# Patient Record
Sex: FEMALE | Race: WHITE | NOT HISPANIC OR LATINO | Employment: FULL TIME | URBAN - NONMETROPOLITAN AREA
[De-identification: names, ages, dates, MRNs, and addresses within clinical notes are randomized per-mention and may not be internally consistent; named-entity substitution may affect disease eponyms.]

---

## 2017-01-11 ENCOUNTER — AMBULATORY - GICH (OUTPATIENT)
Dept: INTERNAL MEDICINE | Facility: OTHER | Age: 32
End: 2017-01-11

## 2017-01-11 ENCOUNTER — HISTORY (OUTPATIENT)
Dept: SURGERY | Facility: OTHER | Age: 32
End: 2017-01-11

## 2017-01-18 ENCOUNTER — SURGERY (OUTPATIENT)
Dept: SURGERY | Facility: OTHER | Age: 32
End: 2017-01-18

## 2017-04-06 ENCOUNTER — HISTORY (OUTPATIENT)
Dept: OBGYN | Facility: OTHER | Age: 32
End: 2017-04-06

## 2017-04-06 ENCOUNTER — AMBULATORY - GICH (OUTPATIENT)
Dept: SCHEDULING | Facility: OTHER | Age: 32
End: 2017-04-06

## 2017-04-06 ENCOUNTER — PRENATAL OFFICE VISIT - GICH (OUTPATIENT)
Dept: OBGYN | Facility: OTHER | Age: 32
End: 2017-04-06

## 2017-04-06 DIAGNOSIS — Z34.91 ENCOUNTER FOR SUPERVISION OF NORMAL PREGNANCY IN FIRST TRIMESTER: ICD-10-CM

## 2017-04-06 LAB
ABORH - HISTORICAL: NORMAL
ABSOLUTE BASOPHILS - HISTORICAL: 0.1 THOU/CU MM
ABSOLUTE EOSINOPHILS - HISTORICAL: 0.2 THOU/CU MM
ABSOLUTE LYMPHOCYTES - HISTORICAL: 2 THOU/CU MM (ref 0.9–2.9)
ABSOLUTE MONOCYTES - HISTORICAL: 0.5 THOU/CU MM
ABSOLUTE NEUTROPHILS - HISTORICAL: 4.7 THOU/CU MM (ref 1.7–7)
ANTIBODY SCREEN - HISTORICAL: NEGATIVE
BASOPHILS # BLD AUTO: 1.8 %
BILIRUB UR QL: NEGATIVE
CLARITY, URINE: CLEAR CLARITY
COLOR UR: YELLOW COLOR
EOSINOPHIL NFR BLD AUTO: 2.2 %
ERYTHROCYTE [DISTWIDTH] IN BLOOD BY AUTOMATED COUNT: 12.7 % (ref 11.5–15.5)
GLUCOSE URINE: NEGATIVE MG/DL
HBSAG CATEGORY - HISTORICAL: NONREACTIVE
HCT VFR BLD AUTO: 40.7 % (ref 33–51)
HEMOGLOBIN: 13.4 G/DL (ref 12–16)
HIV-1/HIV-2 ANTIBODY CATEGORY - HISTORICAL: NORMAL
KETONES UR QL: NEGATIVE MG/DL
LEUKOCYTE ESTERASE URINE: NEGATIVE
LYMPHOCYTES NFR BLD AUTO: 26.6 % (ref 20–44)
MCH RBC QN AUTO: 28.2 PG (ref 26–34)
MCHC RBC AUTO-ENTMCNC: 32.9 G/DL (ref 32–36)
MCV RBC AUTO: 86 FL (ref 80–100)
MONOCYTES NFR BLD AUTO: 6.8 %
NEUTROPHILS NFR BLD AUTO: 62.6 % (ref 42–72)
NITRITE UR QL STRIP: NEGATIVE
OCCULT BLOOD,URINE - HISTORICAL: NEGATIVE
PH UR: 6 [PH]
PLATELET # BLD AUTO: 266 THOU/CU MM (ref 140–440)
PMV BLD: 8.9 FL (ref 6.5–11)
PROTEIN QUALITATIVE,URINE - HISTORICAL: NEGATIVE MG/DL
RED BLOOD COUNT - HISTORICAL: 4.74 MIL/CU MM (ref 4–5.2)
RUBELLA COMMENT - HISTORICAL: NORMAL
SP GR UR STRIP: 1.02
SPECIMEN EXPIRATION DATE/TIME - HISTORICAL: NORMAL
UROBILINOGEN,QUALITATIVE - HISTORICAL: NORMAL EU/DL
WHITE BLOOD COUNT - HISTORICAL: 7.5 THOU/CU MM (ref 4.5–11)

## 2017-04-07 LAB
CULTURE - HISTORICAL: NORMAL
TREPONEMA PALLIDUM - HISTORICAL: NEGATIVE

## 2017-04-13 LAB
CF INTERPRETATION - HISTORICAL: NORMAL
CF RESULT - HISTORICAL: NORMAL

## 2017-04-14 ENCOUNTER — HOSPITAL ENCOUNTER (OUTPATIENT)
Dept: RADIOLOGY | Facility: OTHER | Age: 32
End: 2017-04-14
Attending: OBSTETRICS & GYNECOLOGY

## 2017-04-14 DIAGNOSIS — Z34.91 ENCOUNTER FOR SUPERVISION OF NORMAL PREGNANCY IN FIRST TRIMESTER: ICD-10-CM

## 2017-05-12 ENCOUNTER — PRENATAL OFFICE VISIT - GICH (OUTPATIENT)
Dept: OBGYN | Facility: OTHER | Age: 32
End: 2017-05-12

## 2017-05-12 ENCOUNTER — HISTORY (OUTPATIENT)
Dept: OBGYN | Facility: OTHER | Age: 32
End: 2017-05-12

## 2017-05-12 DIAGNOSIS — Z34.92 ENCOUNTER FOR SUPERVISION OF NORMAL PREGNANCY IN SECOND TRIMESTER: ICD-10-CM

## 2017-06-10 ENCOUNTER — COMMUNICATION - GICH (OUTPATIENT)
Dept: INTERNAL MEDICINE | Facility: OTHER | Age: 32
End: 2017-06-10

## 2017-06-10 DIAGNOSIS — F45.8 OTHER SOMATOFORM DISORDERS: ICD-10-CM

## 2017-06-10 DIAGNOSIS — K21.9 GASTRO-ESOPHAGEAL REFLUX DISEASE WITHOUT ESOPHAGITIS: ICD-10-CM

## 2017-06-16 ENCOUNTER — HOSPITAL ENCOUNTER (OUTPATIENT)
Dept: RADIOLOGY | Facility: OTHER | Age: 32
End: 2017-06-16
Attending: OBSTETRICS & GYNECOLOGY

## 2017-06-16 DIAGNOSIS — Z34.92 ENCOUNTER FOR SUPERVISION OF NORMAL PREGNANCY IN SECOND TRIMESTER: ICD-10-CM

## 2017-06-19 ENCOUNTER — COMMUNICATION - GICH (OUTPATIENT)
Dept: OBGYN | Facility: OTHER | Age: 32
End: 2017-06-19

## 2017-06-20 ENCOUNTER — PRENATAL OFFICE VISIT - GICH (OUTPATIENT)
Dept: OBGYN | Facility: OTHER | Age: 32
End: 2017-06-20

## 2017-06-20 ENCOUNTER — HISTORY (OUTPATIENT)
Dept: OBGYN | Facility: OTHER | Age: 32
End: 2017-06-20

## 2017-06-20 DIAGNOSIS — Z34.92 ENCOUNTER FOR SUPERVISION OF NORMAL PREGNANCY IN SECOND TRIMESTER: ICD-10-CM

## 2017-07-10 ENCOUNTER — COMMUNICATION - GICH (OUTPATIENT)
Dept: LAB | Facility: OTHER | Age: 32
End: 2017-07-10

## 2017-07-10 DIAGNOSIS — Z34.92 ENCOUNTER FOR SUPERVISION OF NORMAL PREGNANCY IN SECOND TRIMESTER: ICD-10-CM

## 2017-07-20 ENCOUNTER — PRENATAL OFFICE VISIT - GICH (OUTPATIENT)
Dept: OBGYN | Facility: OTHER | Age: 32
End: 2017-07-20

## 2017-07-20 ENCOUNTER — AMBULATORY - GICH (OUTPATIENT)
Dept: LAB | Facility: OTHER | Age: 32
End: 2017-07-20

## 2017-07-20 ENCOUNTER — HISTORY (OUTPATIENT)
Dept: OBGYN | Facility: OTHER | Age: 32
End: 2017-07-20

## 2017-07-20 DIAGNOSIS — Z34.92 ENCOUNTER FOR SUPERVISION OF NORMAL PREGNANCY IN SECOND TRIMESTER: ICD-10-CM

## 2017-07-20 DIAGNOSIS — Z34.93 ENCOUNTER FOR SUPERVISION OF NORMAL PREGNANCY IN THIRD TRIMESTER: ICD-10-CM

## 2017-07-20 LAB
GLU GEST SCREEN 1HR 50G: 87 MG/DL (ref 65–139)
HEMOGLOBIN: 11.5 G/DL (ref 12–16)
MCV RBC AUTO: 82 FL (ref 80–100)
PATIENT STATUS - HISTORICAL: NORMAL

## 2017-07-22 LAB — TREPONEMA PALLIDUM - HISTORICAL: NEGATIVE

## 2017-08-18 ENCOUNTER — PRENATAL OFFICE VISIT - GICH (OUTPATIENT)
Dept: OBGYN | Facility: OTHER | Age: 32
End: 2017-08-18

## 2017-08-18 ENCOUNTER — HISTORY (OUTPATIENT)
Dept: OBGYN | Facility: OTHER | Age: 32
End: 2017-08-18

## 2017-08-18 DIAGNOSIS — Z34.93 ENCOUNTER FOR SUPERVISION OF NORMAL PREGNANCY IN THIRD TRIMESTER: ICD-10-CM

## 2017-09-26 ENCOUNTER — HISTORY (OUTPATIENT)
Dept: OBGYN | Facility: OTHER | Age: 32
End: 2017-09-26

## 2017-09-26 ENCOUNTER — PRENATAL OFFICE VISIT - GICH (OUTPATIENT)
Dept: OBGYN | Facility: OTHER | Age: 32
End: 2017-09-26

## 2017-09-26 DIAGNOSIS — Z34.93 ENCOUNTER FOR SUPERVISION OF NORMAL PREGNANCY IN THIRD TRIMESTER: ICD-10-CM

## 2017-09-28 LAB — CULTURE - HISTORICAL: NORMAL

## 2017-10-03 ENCOUNTER — COMMUNICATION - GICH (OUTPATIENT)
Dept: OBGYN | Facility: OTHER | Age: 32
End: 2017-10-03

## 2017-10-03 ENCOUNTER — AMBULATORY - GICH (OUTPATIENT)
Dept: SCHEDULING | Facility: OTHER | Age: 32
End: 2017-10-03

## 2017-10-10 ENCOUNTER — PRENATAL OFFICE VISIT - GICH (OUTPATIENT)
Dept: OBGYN | Facility: OTHER | Age: 32
End: 2017-10-10

## 2017-10-10 ENCOUNTER — HISTORY (OUTPATIENT)
Dept: OBGYN | Facility: OTHER | Age: 32
End: 2017-10-10

## 2017-10-10 ENCOUNTER — HOSPITAL ENCOUNTER (OUTPATIENT)
Dept: RADIOLOGY | Facility: OTHER | Age: 32
End: 2017-10-10
Attending: OBSTETRICS & GYNECOLOGY

## 2017-10-10 DIAGNOSIS — M79.89 OTHER SPECIFIED SOFT TISSUE DISORDERS (CODE): ICD-10-CM

## 2017-10-10 DIAGNOSIS — Z34.93 ENCOUNTER FOR SUPERVISION OF NORMAL PREGNANCY IN THIRD TRIMESTER: ICD-10-CM

## 2017-10-19 ENCOUNTER — PRENATAL OFFICE VISIT - GICH (OUTPATIENT)
Dept: OBGYN | Facility: OTHER | Age: 32
End: 2017-10-19

## 2017-10-19 ENCOUNTER — HISTORY (OUTPATIENT)
Dept: OBGYN | Facility: OTHER | Age: 32
End: 2017-10-19

## 2017-10-19 DIAGNOSIS — Z34.93 ENCOUNTER FOR SUPERVISION OF NORMAL PREGNANCY IN THIRD TRIMESTER: ICD-10-CM

## 2017-10-24 ENCOUNTER — HISTORY (OUTPATIENT)
Dept: OBGYN | Facility: OTHER | Age: 32
End: 2017-10-24

## 2017-10-27 ENCOUNTER — AMBULATORY - GICH (OUTPATIENT)
Dept: OBGYN | Facility: OTHER | Age: 32
End: 2017-10-27

## 2017-10-27 ENCOUNTER — HISTORY (OUTPATIENT)
Dept: OBGYN | Facility: OTHER | Age: 32
End: 2017-10-27

## 2017-10-30 ENCOUNTER — AMBULATORY - GICH (OUTPATIENT)
Dept: SCHEDULING | Facility: OTHER | Age: 32
End: 2017-10-30

## 2017-11-08 ENCOUNTER — HISTORY (OUTPATIENT)
Dept: OBGYN | Facility: OTHER | Age: 32
End: 2017-11-08

## 2017-11-08 ENCOUNTER — COMMUNICATION - GICH (OUTPATIENT)
Dept: OBGYN | Facility: OTHER | Age: 32
End: 2017-11-08

## 2017-12-01 ENCOUNTER — HISTORY (OUTPATIENT)
Dept: OBGYN | Facility: OTHER | Age: 32
End: 2017-12-01

## 2017-12-01 ENCOUNTER — OFFICE VISIT - GICH (OUTPATIENT)
Dept: OBGYN | Facility: OTHER | Age: 32
End: 2017-12-01

## 2017-12-01 ASSESSMENT — PATIENT HEALTH QUESTIONNAIRE - PHQ9: SUM OF ALL RESPONSES TO PHQ QUESTIONS 1-9: 0

## 2017-12-08 ENCOUNTER — COMMUNICATION - GICH (OUTPATIENT)
Dept: OBGYN | Facility: OTHER | Age: 32
End: 2017-12-08

## 2017-12-08 LAB — HPV RESULTS - HISTORICAL: NEGATIVE

## 2017-12-13 ENCOUNTER — COMMUNICATION - GICH (OUTPATIENT)
Dept: INTERNAL MEDICINE | Facility: OTHER | Age: 32
End: 2017-12-13

## 2017-12-13 DIAGNOSIS — F45.8 OTHER SOMATOFORM DISORDERS: ICD-10-CM

## 2017-12-13 DIAGNOSIS — K21.9 GASTRO-ESOPHAGEAL REFLUX DISEASE WITHOUT ESOPHAGITIS: ICD-10-CM

## 2017-12-27 NOTE — PROGRESS NOTES
Patient Information     Patient Name MRN Trinidad Laws N [id number] Female 1985      Progress Notes by Sarah Bucio R.T. ([facility]) at 2017 12:26 PM     Author:  [facility]T. [facility] Service:  (none) Author Type:  [employer] - Registered Radiologic Technologist     Filed:  2017 12:26 PM Date of Service:  2017 12:26 PM Status:  Signed     :  Sarah Bucio R.T. ([facility]) ([employer] - Registered Radiologic Technologist)            Falls Risk Criteria:    Age 65 and older or under age 4        Sensory deficits    Poor vision    Use of ambulatory aides    Impaired judgment    Unable to walk independently    Meets High Risk criteria for falls:  no

## 2017-12-27 NOTE — PROGRESS NOTES
Patient Information     Patient Name MRN Sex Trinidad Toledo N [id number] Female 1985      Progress Notes by Rafael Perez MD at 2017  3:00 PM     Author:  Rafael Perez MD Service:  (none) Author Type:  Physician     Filed:  2017  6:16 PM Encounter Date:  2017 Status:  Signed     :  Rafael Perez MD (Physician)            OB Problem List:    Estimated Date of Delivery: 10/31/17     OB History                    Para  Term     AB  Living     2   1  1  0   0  1     SAB   TAB  Ectopic  Multiple   Live Births       0   0  0  0                           # Outcome Date  GA  Lbr Steven/2nd  Weight Sex  Delivery Anes PTL Lv   2 Current                1 Term 14  40w6d    2.91 kg (6 lb 6.7 oz) F  Vag   LADI                        O Rh Positive  Flu Shot:done  GCT:tbd  Tdap:tbd  Syphilis: neg  GBS:tbd     21w0d    Doing well, no concerns today. US reviewed with her.  She had a brief episode of feeling dizzy last weekend. Better today.  TM's normal x 2.      Recheck in a month.

## 2017-12-28 NOTE — PROGRESS NOTES
Patient Information     Patient Name MRN Sex Trinidad Toledo N [id number] Female 1985      Progress Notes by Lucia Julio RN at 10/27/2017  1:00 PM     Author:  Lucia Julio RN Service:  (none) Author Type:  NURS- Registered Nurse     Filed:  10/27/2017  1:42 PM Encounter Date:  10/27/2017 Status:  Signed     :  Lucia Julio RN (NURS- Registered Nurse)             Post Discharge Breast Feeding Assessment Summary (Mother)       Infant Information:     Infant Name: Osvaldo     YOB: 2016     Current Age: 3 days     Gestational Age: 39 2/7 weeks    Presenting Problem:  Concerns as stated by parent(s): no concerns    PREGNANCY, LABOR AND DELIVERY HISTORY:     Breast Changes: No     Pregnancy Complications: None     Birth Complications: None     Type of Delivery:      Anesthesia: Epidural    MOTHERS HISTORY:     Chronic/acute medical conditions:   Past Medical History:     Diagnosis  Date     GERD (gastroesophageal reflux disease)         History of breast surgery or biopsy: No     Pain Assessment: No pain             Previous Breastfeeding Experience: Yes - nursed daughter for 8 months     Breastfeeding Goal: 6 Months     Special equipment used: none    CURRENT FEEDING PRACTICE:  In the last 24 hours     INTAKE:        When does mother report milk came in:  Day 3        Number of effective Breastfeedings: 10-12        Number of attempts: 10-12        Number of supplements: 0        Method of Supplementing: none        Type of supplement: none        Amount Given in the last 24 hours: 0          PUMPING:        Pump used: Pump in Style        Breast pumping times in last 24 hours: 0        Minutes per pumping session:  0        Amount Obtained per session:  0                Mom's comfort during feeding: denies pain        Moms' positioning/latch technique: Demonstrated correctly        Breast/nipple assessment:          Nipple Eversion:             Right-Everts well     Left-Everts well                     Position used during feeding:  traditional cradle        Summary of Problems Noted: no problems noted  Summary Assessment of Visit:  Effective breastfeeding observed    Consultation Instruction On:  See Education Activity   Correct positioning  Correct latch  Assessment of feeding adequacy  Normals of feeding frequency  Infant cues of feeding readiness  Breast pump use  Indications for supplementation   Techniques to manage forceful let-down  Management of abundant supply  Treatment of engorgement  Treatment of nipple trauma  Signs of infant pain/ irritability  Comfort measures for infant  Milk supply support  Milk collection and storage  Breast compression  Breast massage  Hand expression                                   FOLLOW-UP:      Patient/Family encouraged to follow-up with Provider as scheduled.      Total time spent with patient: 60 minutes

## 2017-12-28 NOTE — TELEPHONE ENCOUNTER
Patient Information     Patient Name MRN Trinidad Laws N [id number] Female 1985      Telephone Encounter by Linwood Mckay LPN at 2017  8:27 AM     Author:  Linwood Mckay LPN Service:  (none) Author Type:  NURS- Licensed Practical Nurse     Filed:  2017  8:27 AM Encounter Date:  2017 Status:  Signed     :  Linwood Mckay LPN (NURS- Licensed Practical Nurse)            ----- Message from Rafael Perez MD sent at 2017  4:54 PM CDT -----  US was read as normal

## 2017-12-28 NOTE — TELEPHONE ENCOUNTER
Patient Information     Patient Name MRTrinidad Mcgregor N [id number] Female 1985      Telephone Encounter by Leslie Hobson RN at 10/3/2017 11:26 AM     Author:  Leslie Hobson RN Service:  (none) Author Type:  NURS- Registered Nurse     Filed:  10/3/2017 11:28 AM Encounter Date:  10/3/2017 Status:  Signed     :  Leslie Hobson RN (NURS- Registered Nurse)            Fax received regarding short term disability following the anticipated delivery of child. Forms filled out to the best of this nurse's ability and forwarded to the provider for review and signature.  Leslie Hobson RN............. 10/3/2017 11:28 AM

## 2017-12-28 NOTE — TELEPHONE ENCOUNTER
Patient Information     Patient Name MRN Sex Trinidad Toledo N [id number] Female 1985      Telephone Encounter by Linwood Mckay LPN at 2017  8:27 AM     Author:  Linwood Mckay LPN Service:  (none) Author Type:  NURS- Licensed Practical Nurse     Filed:  2017  8:28 AM Encounter Date:  2017 Status:  Signed     :  Linwood Mckay LPN (NURS- Licensed Practical Nurse)            Called patient with results after giving last name and date of birth.  Linwood Mckay LPN ..............2017 8:27 AM

## 2017-12-28 NOTE — TELEPHONE ENCOUNTER
Patient Information     Patient Name MRN Trinidad Laws N [id number] Female 1985      Telephone Encounter by Tee Elmore RN at 2017  8:33 AM     Author:  Tee Elmore RN Service:  (none) Author Type:  NURS- Registered Nurse     Filed:  2017  8:38 AM Encounter Date:  6/10/2017 Status:  Signed     :  Tee Elmore RN (NURS- Registered Nurse)            Proton Pump Inhibitors    Office visit in the past 12 months or per provider note.    Last visit with AMIE BOLES was on: 2016 in [facility]  Next visit with AMIE BOLES is on: No future appointment listed with this provider  Next visit with Internal Medicine is on: No future appointment listed in this department    Max refill for 12 months from last office visit or per provider note.  Prescription refilled per RN Medication Refill Policy.................... TEE ELMORE RN ....................  2017   8:37 AM

## 2017-12-28 NOTE — PROGRESS NOTES
Patient Information     Patient Name MRN Sex Trinidad Toledo N [id number] Female 1985      Progress Notes by Rafael Perez MD at 2017  4:00 PM     Author:  Rafael Perez MD Service:  (none) Author Type:  Physician     Filed:  2017  4:12 PM Encounter Date:  2017 Status:  Signed     :  Rafael Perez MD (Physician)            OB Problem List:    Estimated Date of Delivery: 10/31/17                                      OB History                    Para  Term     AB  Living     2   1  1  0   0  1     SAB   TAB  Ectopic  Multiple   Live Births       0   0  0  0                               # Outcome Date  GA  Lbr Steven/2nd  Weight Sex  Delivery Anes PTL Lv   2 Current                                  1 Term 14  40w6d      2.91 kg (6 lb 6.7 oz) F  Vag       LADI                              O Rh Positive  Flu Shot:done  GCT: pass  Tdap: 2017  Syphilis: neg  GBS:tbd     29w3d    Occasional BH contractions, otherwise well.    Recheck in three weeks.

## 2017-12-28 NOTE — PROGRESS NOTES
Patient Information     Patient Name MRN Sex     Trinidad Hunter [id number] Female 1985      Progress Notes by Rafael Perez MD at 10/10/2017  8:45 AM     Author:  Rafael Perez MD Service:  (none) Author Type:  Physician     Filed:  10/10/2017 10:32 AM Encounter Date:  10/10/2017 Status:  Signed     :  Rafael Perez MD (Physician)            OB Problem List:    Estimated Date of Delivery: 10/31/17                                                     OB History                    Para  Term     AB  Living     2   1  1  0   0  1     SAB   TAB  Ectopic  Multiple   Live Births       0   0  0  0                               # Outcome Date  GA  Lbr Steven/2nd  Weight Sex  Delivery Anes PTL Lv   2 Current                                  1 Term 14  40w6d      2.91 kg (6 lb 6.7 oz) F  Vag       LADI                              O Rh Positive  Flu Shot:done  GCT: pass  Tdap: 2017  Syphilis: neg  GBS:neg    [facility]  Return OB Visit    S: Patient reports she has been feeling OK. She denies cramping, contractions, vaginal bleeding, leaking fluid. She reports normal fetal movement.   She has noted two days of increase left leg swelling and pain. No SOB or CP.    O: /76  Pulse 76  Wt 75 kg (165 lb 6.4 oz)  LMP 2017 (Exact Date)  BMI 28.39 kg/m2  See flowsheet    Gen: Well-appearing, NAD  Cervix: 1/50/-1  Pelvis seems adequate  EFW S=D  Left calf slightly more swollen than right, Neg Dereje's sign.  US of LLE was neg for DVT    A/P:  1)Trinidad Hunter is a 32 y.o.  at 37w0d, here for return OB visit.  Recheck in one week.  Discussed kick counts and labor signs    2)Work up for left lower extremity pain, negative for DVT   Conservative treatment, ice, heat, tylenol prn.    Rafael Perez MD FACOG  10:24 AM 10/10/2017

## 2017-12-28 NOTE — TELEPHONE ENCOUNTER
Patient Information     Patient Name Trinidad Andrews N [id number] Female 1985      Telephone Encounter by Leslie Hobson RN at 2017  8:23 AM     Author:  Leslie Hobson RN Service:  (none) Author Type:  NURS- Registered Nurse     Filed:  2017  8:24 AM Encounter Date:  2017 Status:  Signed     :  Leslie Hobson RN (NURS- Registered Nurse)            Office visit in the past 12 months or per provider note.    Last visit with ANA MOORE was on: 2016 in [facility]  Next visit with ANA MOORE is on: 2017 in [facility]  Next visit with OB/GYN is on: 2017 in [facility]    Lab test requirements:  Annual hemoglobin.  HEMOGLOBIN                (g/dL)    Date Value   10/25/2017 9.3 (L)       Max refill for 12 months from last office visit or per provider note.    Prescription refilled per RN Medication Refill Policy.................... Leslie Hobson RN ....................  2017   8:23 AM

## 2017-12-28 NOTE — TELEPHONE ENCOUNTER
Patient Information     Patient Name MRTrinidad Mcgregor N [id number] Female 1985      Telephone Encounter by Sarah Hayden at 7/10/2017 11:40 AM     Author:  Sarah Hayden Service:  (none) Author Type:  (none)     Filed:  7/10/2017 11:42 AM Encounter Date:  7/10/2017 Status:  Signed     :  Sarah Hayden             Patient is coming for labs on 17. Please place orders. Thanks

## 2017-12-28 NOTE — PROGRESS NOTES
Patient Information     Patient Name MRN Sex     Trinidad Hunter [id number] Female 1985      Progress Notes by Rafael Perez MD at 10/19/2017 10:30 AM     Author:  Rafael Perez MD Service:  (none) Author Type:  Physician     Filed:  10/19/2017 10:53 AM Encounter Date:  10/19/2017 Status:  Signed     :  Rafael Perez MD (Physician)            OB Problem List:    Estimated Date of Delivery: 10/31/17                                                     OB History                    Para  Term     AB  Living     2   1  1  0   0  1     SAB   TAB  Ectopic  Multiple   Live Births       0   0  0  0                               # Outcome Date  GA  Lbr Steven/2nd  Weight Sex  Delivery Anes PTL Lv   2 Current                                  1 Term 14  40w6d      2.91 kg (6 lb 6.7 oz) F  Vag       LADI                              O Rh Positive  Flu Shot:done  GCT: pass  Tdap: 2017  Syphilis: neg  GBS:neg    [facility]  Return OB Visit    38w2d    S: Patient reports she has been feeling OK. She denies cramping, contractions, vaginal bleeding, leaking fluid. She reports normal fetal movement.   No changes to leg swelling on left. Neg DVT study last month.    O: /78  Pulse 76  Wt 75.8 kg (167 lb 3.2 oz)  LMP 2017 (Exact Date)  BMI 28.7 kg/m2  See flowsheet    Gen: Well-appearing, NAD  Cervix: 3/50/-1  Pelvis seems adequate  EFW S=D    A/P:  1)Trinidad Hunter is a 32 y.o.  at 37w0d, here for return OB visit.  Induction scheduled for 10/24/17.    Rafael Perez MD FACOG  10:42 AM 10/19/2017

## 2017-12-28 NOTE — PROGRESS NOTES
Patient Information     Patient Name MRN Sex Trinidad Toledo N [id number] Female 1985      Progress Notes by Rafael Perez MD at 2017 12:45 PM     Author:  Rafael Perez MD Service:  (none) Author Type:  Physician     Filed:  2017  3:07 PM Encounter Date:  2017 Status:  Signed     :  Rafael Perez MD (Physician)            OB Problem List:    Estimated Date of Delivery: 10/31/17                       OB History                    Para  Term     AB  Living     2   1  1  0   0  1     SAB   TAB  Ectopic  Multiple   Live Births       0   0  0  0                             # Outcome Date  GA  Lbr Steven/2nd  Weight Sex  Delivery Anes PTL Lv   2 Current                         1 Term 14  40w6d     2.91 kg (6 lb 6.7 oz) F  Vag     LADI                           O Rh Positive  Flu Shot:done  GCT:2017  Tdap: 2017  Syphilis: neg  GBS:tbd    25w2d    Doing well, no concerns  GCT today, tdap as well    Recheck in a month

## 2017-12-28 NOTE — PATIENT INSTRUCTIONS
Patient Information     Patient Name MRN Trinidad Laws N [id number] Female 1985      Patient Instructions by Lcuia Julio RN at 10/27/2017  1:00 PM     Author:  Lucia Julio RN Service:  (none) Author Type:  NURS- Registered Nurse     Filed:  10/27/2017  1:05 PM Encounter Date:  10/27/2017 Status:  Signed     :  Lucia Julio RN (NURS- Registered Nurse)            *  Breastfeed your child at least 8 to 12 times in a 24 hour period.    *  Look for early feeding cues such as rooting, rapid eye movement, hands/fists to         mouth, ect.    *  Feed on the first breast without time restriction, offer the second breast    *  Observe for visible suck/audible swallow    *  Make sure the latch/positioning is correct.  Readjust immediately if needed.    *  Call or return to clinic if having signs or symptoms of mastitis such as:   Breast tissue that is hot, inflamed, painful to touch.  Fever, body aches, chills.    *  Try to increase your caloric intake by about 500 calories and continue to take your       prenatal vitamin the entire duration of breastfeeding.     *  Return to the clinic for your next scheduled appointment with your provider or sooner if     you are having any concerns or issues.

## 2017-12-28 NOTE — PROGRESS NOTES
Patient Information     Patient Name MRN Sex     Trinidad Hunter [id number] Female 1985      Progress Notes by Rafael Perez MD at 2017  3:45 PM     Author:  Rafael Perez MD Service:  (none) Author Type:  Physician     Filed:  2017  5:43 PM Encounter Date:  2017 Status:  Signed     :  Rafael Perez MD (Physician)            OB Problem List:    Estimated Date of Delivery: 10/31/17                                                     OB History                    Para  Term     AB  Living     2   1  1  0   0  1     SAB   TAB  Ectopic  Multiple   Live Births       0   0  0  0                               # Outcome Date  GA  Lbr Steven/2nd  Weight Sex  Delivery Anes PTL Lv   2 Current                                  1 Term 14  40w6d      2.91 kg (6 lb 6.7 oz) F  Vag       LADI                              O Rh Positive  Flu Shot:done  GCT: pass  Tdap: 2017  Syphilis: neg  GBS:2017    35w0d    [facility]  Return OB Visit    S: Patient reports she has been feeling well. She denies cramping, contractions, vaginal bleeding, leaking fluid. She reports normal fetal movement. She has no other complaints.    O: /72  Pulse 82  Wt 74.5 kg (164 lb 3.2 oz)  LMP 2017 (Exact Date)  BMI 28.18 kg/m2  See flowsheet    Gen: Well-appearing, NAD  Cervix:     Fundal Height:  34  FHR: 155  EFW: S=D  Pelvis: seems adequate  GBS collected.    A/P:  Trinidad Hunter is a 32 y.o.  at 35w0d, here for return OB visit.    RTC 2 weeks.    Rafael Perez MD FACOG  5:41 PM 2017

## 2017-12-30 NOTE — NURSING NOTE
Patient Information     Patient Name MRN Trinidad Laws N [id number] Female 1985      Nursing Note by Lara Villanueva at 2017 12:45 PM     Author:  Lara Villanueva Service:  (none) Author Type:  (none)     Filed:  2017  1:58 PM Encounter Date:  2017 Status:  Signed     :  Lara Villanueva            Patient presents for routine OB care currently at 25w2d. Patient was offered the breastfeeding booklet, [employer] flyer, Operational Delivery consent for review. 2 Babystep coupons given.     Vanita Villanueva LPN............. 2017 1:58 PM

## 2017-12-30 NOTE — NURSING NOTE
Patient Information     Patient Name MRTrinidad Mcgregor N [id number] Female 1985      Nursing Note by Donna Herrera RN at 2017  4:00 PM     Author:  Donna Herrera RN Service:  (none) Author Type:  NURS- Registered Nurse     Filed:  2017  4:06 PM Encounter Date:  2017 Status:  Signed     :  Donna Herrera RN (NURS- Registered Nurse)            1 Babystep coupon given. Donna Herrera RN .............. 2017  3:58 PM

## 2017-12-30 NOTE — NURSING NOTE
Patient Information     Patient Name MRTrinidad Mcgregor N [id number] Female 1985      Nursing Note by Leslie Hobson RN at 2017  3:00 PM     Author:  Leslie Hobson RN Service:  (none) Author Type:  NURS- Registered Nurse     Filed:  2017  3:19 PM Encounter Date:  2017 Status:  Signed     :  Leslie Hobson RN (NURS- Registered Nurse)            Patient is here for routine OB care - no concerns today. ITS A BOY!  Leslie Hobson RN............. 2017 3:07 PM

## 2018-01-04 NOTE — PROGRESS NOTES
Patient Information     Patient Name MRN Trinidad Laws N [id number] Female 1985      Progress Notes by Francisca Mitchell at 2017 11:19 AM     Author:  Francisca Mitchell Service:  (none) Author Type:  Other Clinical Staff     Filed:  2017 11:39 AM Date of Service:  2017 11:19 AM Status:  Signed     :  Francisca Mitchell (Other Clinical Staff)            Falls Risk Criteria:    Age 65 and older or under age 4        Sensory deficits    Poor vision    Use of ambulatory aides    Impaired judgment    Unable to walk independently    Meets High Risk criteria for falls:  No

## 2018-01-04 NOTE — PROGRESS NOTES
"Patient Information     Patient Name MRN Trinidad Laws [id number] Female 1985      Progress Notes by Rafael Perez MD at 2017 10:34 AM     Author:  Rafael Perez MD Service:  (none) Author Type:  Physician     Filed:  2017 10:36 AM Encounter Date:  2017 Status:  Signed     :  Rafael Perez MD (Physician)              PRENATAL VISIT   FIRST OBSTETRICAL EXAM - OB    Trinidad Hunter is a 31 y.o. female, G 2, P 1001, is here today for her First Obstetrical Exam. Ethnicity: /White    OB History                    Para  Term     AB  Living     2   1  1  0   0  1     SAB   TAB  Ectopic  Multiple   Live Births       0   0  0  0                           # Outcome Date  GA  Lbr Steven/2nd  Weight Sex  Delivery Anes PTL Lv   2 Current                1 Term 14  40w6d    2.91 kg (6 lb 6.7 oz) F  Vag   LADI                        Allergies: Sulfa (sulfonamide antibiotics)  Current Outpatient Prescriptions       Medication  Sig Dispense Refill     lansoprazole (PREVACID) 30 mg capsule Take 1 capsule by mouth once daily. 90 capsule 1     Prenatal Multivit-Ca-Min-Fe-FA (PRENATAL VITAMIN) tab tablet Take 1 tablet by mouth once daily.  0     No current facility-administered medications for this visit.      Medications have been reviewed by me and are current to the best of my knowledge and ability.      6 - 14 WEEKS: [place] Pregnancy Risk Assessment Form completed, Urine Culture Ordered, Prenatal Profile (if not already completed) and Domestic Abuse reviewed     MENSTRUAL HISTORY  LMP:  Patient's last menstrual period was 2017.  Date Reliability:definite    RISK FACTORS  Exercise Times/wk: 5  Seat Belt Use: 100%  Alcohol/day: 0  Current Drug Use: none  Birth Control Method: none  High Risk Behavior: none    ROS  Review of Systems Negative.    PHYSICAL EXAM  /72  Pulse 74  Ht 1.632 m (5' 4.25\")  Wt 64.3 kg (141 lb 12.8 oz)  LMP 2017  " Breastfeeding? No  BMI 24.15 kg/m2  General Appearance:  Alert, appropriate appearance for age. No acute distress  HEENT Exam:  Grossly normal.  Neck / Thyroid Exam:  Supple, no masses, nodes or enlargement.  Chest/Respiratory Exam: Normal chest wall and respirations. Clear to auscultation.  Cardiovascular Exam: Regular rate and rhythm. S1, S2, no murmur, click, gallop, or rubs.  Gastrointestinal Exam: Soft, non-tender, no masses or organomegaly.  Lymphatic Exam: Non-palpable nodes in neck, clavicular, axillary, or inguinal regions.  Musculoskeletal Exam: Back is straight and non-tender, full ROM of upper and lower extremities.  Skin: no rash or abnormalities  Neurologic Exam: Normal gait and speech, no tremor.  Psychiatric Exam: Alert and oriented, appropriate affect.      Ultrasound #1: 10 weeks  DAVE:  Estimated Date of Delivery: 10/31/17      ASSESSMENT/PLAN  Normal first prenatal visit.  Discussed orientation, general information, lifestyle, nutrition, exercise, warning signs, resources, lab testing, risk screening and discussed cystic fibrosis screening with patient.  Questions answered.    (Z34.91) Normal pregnancy, first trimester  (primary encounter diagnosis)  Comment:   Plan: GYN THIN PREP PAP SCREEN IMAGED, ANTI HIV 1/2,         URINE CULTURE, TYPE AND SCREEN, CBC AND         DIFFERENTIAL, HBSAG (HBS), RUBELLA IMMUNE         STATUS, TREPONEMA PALLIDUM, URINALYSIS W REFLEX        MICROSCOPIC IF POSITIVE, CYSTIC FIBROSIS         SCREEN, US OB 1ST TRI SINGLE TA, GC CHLAMYDIA         TRACH PROBE, ANTI HIV 1/2, TYPE AND SCREEN, CBC        AND DIFFERENTIAL, HBSAG (HBS), RUBELLA IMMUNE         STATUS, TREPONEMA PALLIDUM, CYSTIC FIBROSIS         SCREEN, CBC WITH AUTO DIFFERENTIAL, URINE         CULTURE, URINALYSIS W REFLEX MICROSCOPIC IF         POSITIVE, GC CHLAMYDIA TRACH PROBE, CANCELED:         GC CHLAMYDIA TRACH PROBE                 Return to office in 4 week(s) for re-check.

## 2018-01-05 NOTE — NURSING NOTE
Patient Information     Patient Name MRN Trinidad Laws N [id number] Female 1985      Nursing Note by Jossie Bey at 2017 11:30 AM     Author:  Jossie Bey Service:  (none) Author Type:  NURS- Registered Nurse     Filed:  2017 11:48 AM Encounter Date:  2017 Status:  Signed     :  Jossie Bey (NURS- Registered Nurse)            Prenatal visit.  1 Babystep coupon given.  Jossie Bey RN .............. 2017  11:38 AM

## 2018-01-05 NOTE — PROGRESS NOTES
"Patient Information     Patient Name MRN Sex Trinidad Toledo [id number] Female 1985      Progress Notes by Rafael Perez MD at 2017 11:30 AM     Author:  Rafael Perez MD Service:  (none) Author Type:  Physician     Filed:  2017 12:53 PM Encounter Date:  2017 Status:  Signed     :  Rafael Perez MD (Physician)            OB Problem List:    Estimated Date of Delivery: 10/31/17  15w3d    O Rh Positive  Flu Shot:done  GCT:tbd  Tdap:tbd  Syphilis: neg  GBS:tbd    Immunization History     Administered  Date(s) Administered     Influenza, IIV4 (Age >= 3 Years) 2016       CC: Recheck OB visit at 15w3d    HPI: Trinidad Hunter presents for a routine OB visit now at 15w3d  She has no concerns. Active baby. Denies cramping, bleeding, normal fetal movement    Current Outpatient Prescriptions on File Prior to Visit       Medication  Sig Dispense Refill     lansoprazole (PREVACID) 30 mg capsule Take 1 capsule by mouth once daily. 90 capsule 1     Prenatal Multivit-Ca-Min-Fe-FA (PRENATAL VITAMIN) tab tablet Take 1 tablet by mouth once daily.  0     No current facility-administered medications on file prior to visit.      REVIEW OF SYSTEMS:  Social History Narrative    None on file      No family history on file.    O:   /62  Pulse 66  Ht 1.626 m (5' 4\")  Wt 65.1 kg (143 lb 9.6 oz)  LMP 2017 (Exact Date)  Breastfeeding? No  BMI 24.65 kg/m2  Body mass index is 24.65 kg/(m^2).    See OB flow sheet   EXAM:  General Appearance: Pleasant, alert, appropriate appearance for age. No acute distress    Results for orders placed or performed in visit on 17       ANTI HIV 1/2       Result   Value Ref Range    HIV-1/HIV-2 ANTIBODY  Non-Reactive Non-Reactive   HBSAG (HBS)       Result   Value Ref Range    HBSAG  Nonreactive Nonreactive   RUBELLA IMMUNE STATUS       Result   Value Ref Range    RUBELLA DEVON, QUANT      TREPONEMA PALLIDUM       Result   Value Ref Range    " TREPONEMA PALLIDUM  Negative Negative   CYSTIC FIBROSIS SCREEN       Result   Value Ref Range    CF RESULT  Negative for mutations tested Negative for mutations tested    INTERPRETATION        It is our understanding that this individual has a negative personal and family history for cystic fibrosis (CF).  Using the methodology described, this individual is negative for the 23 CF mutation screening test recommended by the American [employer]. These results do not rule out the possibility that this individual could be a carrier of a mutation not detected by this test. The following table provides data to be used in the genetic counseling for the individual. Limited information is available for individuals from other ethnic populations.                                                      Estimated carrier risk                                              ---------------------        Ethnic group       Detection        Before    After negative                             rate            test          test        Ashkenazi Restorationism     94%             1/24         ~1 in 380        Non- white   88%             1/25         ~1 in 200             American     64%             1/61         ~1 in 170         American       49%             1/94         ~1 in 180            General Disclaimer: DNA studies do not constitute a definitive carrier test for CF in all individuals.  Thus, interpretation is given as a probability.  It should be realized that there are many possible sources of diagnostic error.  Genotyping errors can result from trace contamination of PCR reactions and from rare genetic variants that interfere with analysis. Accurate risk calculation requires accurate family history information.  Inaccurate reporting of family history of CF will lead to errors in residual risk assessment.     CBC WITH AUTO DIFFERENTIAL       Result   Value Ref Range    WHITE BLOOD COUNT          7.5  4.5 - 11.0 thou/cu mm    RED BLOOD COUNT            4.74 4.00 - 5.20 mil/cu mm    HEMOGLOBIN                 13.4 12.0 - 16.0 g/dL    HEMATOCRIT                 40.7 33.0 - 51.0 %    MCV                        86 80 - 100 fL    MCH                        28.2 26.0 - 34.0 pg    MCHC                       32.9 32.0 - 36.0 g/dL    RDW                        12.7 11.5 - 15.5 %    PLATELET COUNT             266 140 - 440 thou/cu mm    MPV                        8.9 6.5 - 11.0 fL    NEUTROPHILS                62.6 42.0 - 72.0 %    LYMPHOCYTES                26.6 20.0 - 44.0 %    MONOCYTES                  6.8 <12.0 %    EOSINOPHILS                2.2 <8.0 %    BASOPHILS                  1.8 <3.0 %    ABSOLUTE NEUTROPHILS       4.7 1.7 - 7.0 thou/cu mm    ABSOLUTE LYMPHOCYTES       2.0 0.9 - 2.9 thou/cu mm    ABSOLUTE MONOCYTES         0.5 <0.9 thou/cu mm    ABSOLUTE EOSINOPHILS       0.2 <0.5 thou/cu mm    ABSOLUTE BASOPHILS         0.1 <0.3 thou/cu mm   URINE CULTURE       Result   Value Ref Range    CULTURE        10-50,000 CFU/mL of multiple organisms, probable contaminants     URINALYSIS W REFLEX MICROSCOPIC IF POSITIVE       Result   Value Ref Range    COLOR                      Yellow Yellow Color    CLARITY                    Clear Clear Clarity    SPECIFIC GRAVITY,URINE     1.020 1.010, 1.015, 1.020, 1.025                    PH,URINE                   6.0 6.0, 7.0, 8.0, 5.5, 6.5, 7.5, 8.5                    UROBILINOGEN,QUALITATIVE   Normal Normal EU/dl    PROTEIN, URINE  Negative Negative mg/dL    GLUCOSE, URINE  Negative Negative mg/dL    KETONES,URINE              Negative Negative mg/dL    BILIRUBIN,URINE            Negative Negative                    OCCULT BLOOD,URINE         Negative Negative                    NITRITE                    Negative Negative                    LEUKOCYTE ESTERASE         Negative Negative                   GC CHLAMYDIA TRACH PROBE       Result   Value Ref Range    CHLAMYDIA  PCR  NOT Detected NOT Detected, Invalid    N GONORRHOEAE PCR  NOT Detected NOT Detected, Invalid   TYPE AND SCREEN       Result   Value Ref Range    ABORH                      O Rh Positive                    ANTIBODY SCREEN  Negative Negative                    SPECIMEN EXPIRATION DATE/TIME  04/09/17 10:00          A: Trinidad Hunter at 15w3d    P: 1. Normal pregnancy, second trimester  Doing well, Discussed aneuploidy screening. Recheck in a month       Rafael Perez MD FACOG  11:49 AM 5/12/2017

## 2018-01-25 ENCOUNTER — COMMUNICATION - GICH (OUTPATIENT)
Dept: OBGYN | Facility: OTHER | Age: 33
End: 2018-01-25

## 2018-01-25 VITALS
HEART RATE: 80 BPM | HEIGHT: 64 IN | SYSTOLIC BLOOD PRESSURE: 106 MMHG | HEART RATE: 82 BPM | DIASTOLIC BLOOD PRESSURE: 72 MMHG | WEIGHT: 155.4 LBS | SYSTOLIC BLOOD PRESSURE: 116 MMHG | BODY MASS INDEX: 26.67 KG/M2 | WEIGHT: 160.4 LBS | BODY MASS INDEX: 27.39 KG/M2 | DIASTOLIC BLOOD PRESSURE: 66 MMHG

## 2018-01-25 VITALS
DIASTOLIC BLOOD PRESSURE: 78 MMHG | BODY MASS INDEX: 28.18 KG/M2 | HEIGHT: 64 IN | SYSTOLIC BLOOD PRESSURE: 112 MMHG | BODY MASS INDEX: 25.85 KG/M2 | WEIGHT: 167.2 LBS | DIASTOLIC BLOOD PRESSURE: 72 MMHG | HEART RATE: 64 BPM | WEIGHT: 151.4 LBS | WEIGHT: 164.2 LBS | DIASTOLIC BLOOD PRESSURE: 72 MMHG | HEART RATE: 82 BPM | SYSTOLIC BLOOD PRESSURE: 108 MMHG | SYSTOLIC BLOOD PRESSURE: 122 MMHG | BODY MASS INDEX: 28.7 KG/M2 | HEART RATE: 76 BPM

## 2018-01-25 VITALS
BODY MASS INDEX: 24.52 KG/M2 | HEART RATE: 66 BPM | SYSTOLIC BLOOD PRESSURE: 120 MMHG | DIASTOLIC BLOOD PRESSURE: 62 MMHG | WEIGHT: 143.6 LBS | HEIGHT: 64 IN

## 2018-01-25 VITALS
HEART RATE: 74 BPM | DIASTOLIC BLOOD PRESSURE: 72 MMHG | SYSTOLIC BLOOD PRESSURE: 118 MMHG | SYSTOLIC BLOOD PRESSURE: 118 MMHG | BODY MASS INDEX: 24.21 KG/M2 | WEIGHT: 165.4 LBS | HEIGHT: 64 IN | BODY MASS INDEX: 28.17 KG/M2 | WEIGHT: 141.8 LBS | DIASTOLIC BLOOD PRESSURE: 76 MMHG | HEART RATE: 76 BPM

## 2018-02-09 VITALS
DIASTOLIC BLOOD PRESSURE: 74 MMHG | WEIGHT: 147.6 LBS | BODY MASS INDEX: 25.34 KG/M2 | SYSTOLIC BLOOD PRESSURE: 108 MMHG | HEART RATE: 82 BPM

## 2018-02-11 ASSESSMENT — PATIENT HEALTH QUESTIONNAIRE - PHQ9: SUM OF ALL RESPONSES TO PHQ QUESTIONS 1-9: 0

## 2018-02-12 NOTE — TELEPHONE ENCOUNTER
Patient Information     Patient Name MRN Trinidad Laws N [id number] Female 1985      Telephone Encounter by Caitlin Cain RN at 2017 10:10 AM     Author:  Caitlin Cain RN Service:  (none) Author Type:  NURS- Registered Nurse     Filed:  2017 10:15 AM Encounter Date:  2017 Status:  Signed     :  Caitlin Cain RN (NURS- Registered Nurse)            lansoprazole (PREVACID) 30 mg capsule  TAKE 1 CAPSULE BY MOUTH ONCE DAILY.       Disp: 90 capsule Refills: 1    Class: eRx Start: 2017    For: Gastroesophageal reflux disease, esophagitis presence not specified, Globus sensation  Originally ordered: 1 year ago by Amie Magdaleno DO  Last refill:2017  To be filled at: [facility] [address] [place] [address] [address] [place].Phone: [phone]    Last visit with AMIE MAGDALENO was on: 2016 in [facility]  Next visit with AMIE MAGDALENO is on: No future appointment listed with this provider  Next visit with Internal Medicine is on: No future appointment listed in this department    Patient noted to have Post partum visit with Dr. Perez on 17  Will route to AMIE MAGDALENO DO for review and consideration of refills.    Unable to complete prescription refill per RN Medication Refill Policy.................... CAITLIN CAIN RN ....................  2017   10:14 AM

## 2018-02-12 NOTE — TELEPHONE ENCOUNTER
Patient Information     Patient Name MRN Trinidad Laws N [id number] Female 1985      Telephone Encounter by Donna Quiroz at 12/15/2017  9:35 AM     Author:  Donna Quiroz Service:  (none) Author Type:  (none)     Filed:  12/15/2017  9:35 AM Encounter Date:  2017 Status:  Signed     :  Donna Quiroz            Patient notified refill request has went to her pharmacy.  She will call back to schedule physical appointment.  Donna Quiroz LPN..................12/15/2017  9:35 AM

## 2018-02-12 NOTE — TELEPHONE ENCOUNTER
Patient Information     Patient Name MRN Trinidad Laws N [id number] Female 1985      Telephone Encounter by Nighat Magdaleno DO at 2017 11:19 AM     Author:  Nighat Magdaleno DO Service:  (none) Author Type:  PHYS- Osteopathic     Filed:  2017 11:19 AM Encounter Date:  2017 Status:  Signed     :  Nighat Magdaleno DO (PHYS- Osteopathic)            Medication sent.  It has been over a year since I have seen the patient and I would recommend that she set up her annual physical in the next 3-6 months.

## 2018-02-12 NOTE — TELEPHONE ENCOUNTER
Patient Information     Patient Name MRN Trinidad Laws N [id number] Female 1985      Telephone Encounter by Caitlin Stoner RN at 2017 11:21 AM     Author:  Caitlin Stoner RN Service:  (none) Author Type:  NURS- Registered Nurse     Filed:  2017 11:21 AM Encounter Date:  2017 Status:  Signed     :  Caitlin Stoner RN (NURS- Registered Nurse)            Letter and my chart message sent.  CAITLIN STONER RN ....................  2017   11:21 AM

## 2018-02-12 NOTE — ADDENDUM NOTE
Patient Information     Patient Name MRN Trinidad Laws N [id number] Female 1985      Addendum Note by Krystal Moss at 2017 10:03 AM     Author:  rKystal Moss Service:  (none) Author Type:  (none)     Filed:  2017 10:03 AM Encounter Date:  2017 Status:  Signed     :  Krystal Moss       Addended by: KRYSTAL MOSS on: 2017 10:03 AM        Modules accepted: Orders

## 2018-02-12 NOTE — PROGRESS NOTES
Patient Information     Patient Name MRN Trinidad Laws [id number] Female 1985      Progress Notes by Rafael Perez MD at 2017 11:15 AM     Author:  Rafael Perez MD Service:  (none) Author Type:  Physician     Filed:  2017 11:40 AM Encounter Date:  2017 Status:  Signed     :  Rafael Perez MD (Physician)            CC: postpartum exam at six weeks from delivery    HPI: Trinidad Hunter presents for postpartum exam. Baby was born by vaginal delivery at 39w0d weeks gestation. Complications included none  Mood:OK    PHQ Depression Screen  Date of PHQ exam: 17  Over the last 2 weeks, how often have you been bothered by any of the following problems?  1. Little interest or pleasure in doing things: 0 - Not at all  2. Feeling down, depressed, or hopeless: 0 - Not at all  3. Trouble falling or staying asleep, or sleeping too much: 0 - Not at all  4. Feeling tired or having little energy: 0 - Not at all  5. Poor appetite or overeatin - Not at all  6. Feeling bad about yourself - or that you are a failure or have let yourself or your family down: 0 - Not at all  7. Trouble concentrating on things, such as reading the newspaper or watching television: 0 - Not at all  8. Moving or speaking so slowly that other people could have noticed. Or the opposite - being so fidgety or restless that you have been moving around a lot more than usual: 0 - Not at all  9. Thoughts that you would be better off dead, or of hurting yourself in some way: 0 - Not at all    PHQ-9 TOTAL SCORE: 0  Depression Severity Level: none  If any answers were positive, how difficult have these problems made it for you to do your work, take care of things at home, or get along with other people: not difficult at all  Breastfeeding:yes  Incision(s): none  Bleeding: no  Birthcontrol:considerring IUD      No results found for: INTERPRESLT, PAPBETH, HPVRESULTS, WVGGTNE24, BKBUWMO25    Past Medical History:      Diagnosis  Date     GERD (gastroesophageal reflux disease)      Past Surgical History:      Procedure  Laterality Date     NO PREVIOUS SURGERY       LA OBSTE CARE VAG DELIV W POSTPARTUM  10/24/2017            Current Outpatient Prescriptions on File Prior to Visit       Medication  Sig Dispense Refill     Breast Pump - Purchase For home use. Gestation age at delivery: 39 weeks. Reason for need: separation from baby. Length of need: 1 year 1 Device 0     ferrous sulfate 325 mg delayed release tablet Take 1 tablet by mouth 2 times daily with meals. 100 tablet 0     lansoprazole (PREVACID) 30 mg capsule TAKE 1 CAPSULE BY MOUTH ONCE DAILY. 90 capsule 1     prenatal vitamin-folic acid 1 mg (PRENATAL RX) tablet/capsule Take 1 tablet by mouth once daily. 100 capsule 0     No current facility-administered medications on file prior to visit.      Allergies     Allergen  Reactions     Sulfa (Sulfonamide Antibiotics) Rash       COMPLETE REVIEW OF SYSTEMS: see HPI      O: /74  Pulse 82  Wt 67 kg (147 lb 9.6 oz)  LMP 01/24/2017 (Exact Date)  Breastfeeding? No  BMI 25.34 kg/m2 Body mass index is 25.34 kg/(m^2).    EXAM:  General Appearance: Pleasant, alert, appropriate appearance for age. No acute distress  Genitourinary Exam Female: Normal: External genitalia, vulva and vagina appear normal. Bimanual exam reveals normal uterus and adnexa, nontender urethra and bladder.    Results for orders placed or performed during the hospital encounter of 10/24/17      CBC WITH AUTO DIFFERENTIAL      Result  Value Ref Range    WHITE BLOOD COUNT         11.9 (H) 4.5 - 11.0 thou/cu mm    RED BLOOD COUNT           4.11 4.00 - 5.20 mil/cu mm    HEMOGLOBIN                9.7 (L) 12.0 - 16.0 g/dL    HEMATOCRIT                30.7 (L) 33.0 - 51.0 %    MCV                       75 (L) 80 - 100 fL    MCH                       23.6 (L) 26.0 - 34.0 pg    MCHC                      31.6 (L) 32.0 - 36.0 g/dL    RDW                       14.8  11.5 - 15.5 %    PLATELET COUNT            232 140 - 440 thou/cu mm    MPV                       11.3 (H) 6.5 - 11.0 fL    NEUTROPHILS               69.7 42.0 - 72.0 %    LYMPHOCYTES               19.8 (L) 20.0 - 44.0 %    MONOCYTES                 6.0 <12.0 %    EOSINOPHILS               2.5 <8.0 %    BASOPHILS                 0.7 <3.0 %    IMMATURE GRANULOCYTES(METAS,MYELOS,PROS) 1.3 %    ABSOLUTE NEUTROPHILS      8.3 (H) 1.7 - 7.0 thou/cu mm    ABSOLUTE LYMPHOCYTES      2.4 0.9 - 2.9 thou/cu mm    ABSOLUTE MONOCYTES        0.7 <0.9 thou/cu mm    ABSOLUTE EOSINOPHILS      0.3 <0.5 thou/cu mm    ABSOLUTE BASOPHILS        0.1 <0.3 thou/cu mm    ABSOLUTE IMMATURE GRANULOCYTES(METAS,MYELOS,PROS) 0.2 <=0.3 thou/cu mm   Hemoglobin      Result  Value Ref Range    HEMOGLOBIN                9.3 (L) 12.0 - 16.0 g/dL    MCV                       77 (L) 80 - 100 fL         I/P:  (Z39.1) Postpartum care and examination of lactating mother  (primary encounter diagnosis)  Comment:   Plan: GYN THIN PREP PAP SCREEN IMAGED            Resume annual preventive healthcare. Continue PNV's until done with childbearing.      RTC prn    Based on what occurred in the visit today:  Previous medication(s) were discontinued or altered? No  Previous medication(s) were suspended pending consultation? No  New medication(s) started? No        Rafael Perez MD FACOG  11:38 AM 12/1/2017

## 2018-02-13 NOTE — TELEPHONE ENCOUNTER
Patient Information     Patient Name MRN Trinidad Laws N [id number] Female 1985      Telephone Encounter by Vijaya Valladares RN at 2018  2:14 PM     Author:  Vijaya Valladares RN Service:  (none) Author Type:  NURS- Registered Nurse     Filed:  2018  2:17 PM Encounter Date:  2018 Status:  Signed     :  Vijaya Valladares RN (NURS- Registered Nurse)            Office visit in the past 12 months or per provider note.    Last visit with ANA MOORE was on: 2017 in [facility]  Next visit with ANA MOORE is on: No future appointment listed with this provider  Next visit with OB/GYN is on: No future appointment listed in this department    Max refill for 12 months from last office visit or per provider note.    Patient is due for medication management appointment. Limited refill provided at this time. [employer] message and/or letter sent for reminder to patient. Prescription refilled per RN Medication Refill Policy.................... Vijaya Valladares RN ....................  2018   2:16 PM

## 2018-03-13 ENCOUNTER — DOCUMENTATION ONLY (OUTPATIENT)
Dept: FAMILY MEDICINE | Facility: OTHER | Age: 33
End: 2018-03-13

## 2018-03-13 RX ORDER — BREAST PUMP
EACH MISCELLANEOUS
COMMUNITY
Start: 2017-10-26 | End: 2018-10-09

## 2018-03-13 RX ORDER — FERROUS SULFATE 325(65) MG
325 TABLET, DELAYED RELEASE (ENTERIC COATED) ORAL 2 TIMES DAILY WITH MEALS
COMMUNITY
Start: 2017-11-08 | End: 2018-10-09

## 2018-03-13 RX ORDER — PRENATAL VIT/IRON FUM/FOLIC AC 27MG-0.8MG
1 TABLET ORAL DAILY
COMMUNITY
Start: 2017-10-26 | End: 2019-01-03

## 2018-03-13 RX ORDER — LANSOPRAZOLE 30 MG/1
30 CAPSULE, DELAYED RELEASE ORAL DAILY
COMMUNITY
Start: 2017-12-14 | End: 2018-04-14

## 2018-04-14 DIAGNOSIS — R09.A2 GLOBUS SENSATION: ICD-10-CM

## 2018-04-14 DIAGNOSIS — K21.9 GASTROESOPHAGEAL REFLUX DISEASE, ESOPHAGITIS PRESENCE NOT SPECIFIED: Primary | ICD-10-CM

## 2018-04-14 NOTE — LETTER
April 19, 2018      Trinidad IRA rehana  [address]  [facility] [address]        This is to remind you that you are due for your annual office visit with Nighat Magdaleno DO. Your last visit was on 12/14/2016.     Additional refills of your medication require you to complete this visit.    Please call [phone] to schedule your appointment.    Thank you for choosing [facility] and [facility] for your health care needs.    Sincerely,      Refill RN  [facility]

## 2018-04-19 RX ORDER — LANSOPRAZOLE 30 MG/1
CAPSULE, DELAYED RELEASE ORAL
Qty: 90 CAPSULE | Refills: 0 | Status: SHIPPED | OUTPATIENT
Start: 2018-04-19 | End: 2018-10-09

## 2018-07-23 NOTE — PROGRESS NOTES
Patient Information     Patient Name  Trinidad Hunter MRN  [id number] Sex  Female   1985      Letter by Amie Magdaleno DO at      Author:  Amie Magdaleno DO Service:  (none) Author Type:  (none)    Filed:   Encounter Date:  2017 Status:  (Other)           Trinidad Hunter  [address]  [facility] [address]          2017    Dear Ms. Hunter:    A refill of Lansoprazole (PREVACID) 30 mg capsule has been called into your pharmacy.    Additional refills require an office visit with AMIE MAGDALENO DO in the next 3-6 months for annual review.   Please call the clinic at [phone] to schedule your appointment.    Thank you,    The Refill Nurse  [facility]

## 2018-07-23 NOTE — PROGRESS NOTES
Patient Information     Patient Name  Trinidad Hunter MRN  [id number] Sex  Female   1985      Letter by Rafael Perez MD at      Author:  Rafael Perez MD Service:  (none) Author Type:  (none)    Filed:   Encounter Date:  2018 Status:  (Other)           Trinidad Hunter  [address]  [facility] [address]          2018    Dear Ms. Hunter:    This letter is to remind you that you are due for your annual exam with AMIE BOLES DO. Your last comprehensive visit was more than 12 months ago.    A LIMITED refill of PRENATAL PLUS, CALCIUM CARB, 27 mg iron- 1 mg tablet has been called into your pharmacy. Additional refills require you to complete this appointment.    Please call the clinic at [phone] to schedule your appointment.    If you should require additional refills before your scheduled appointment, please contact your pharmacy and we will refill your medication until the date of your appointment.    If you are no longer seeing AMIE BOLES DO for primary care, please call to let us know. Doing so will remove you from our call/contact list.      Thank you for choosing [facility] and [facility] for your health care needs.    Sincerely,    Refill RN  [facility]

## 2018-07-24 NOTE — PROGRESS NOTES
Patient Information     Patient Name  Trinidad Hunter MRN  [id number] Sex  Female   1985      Letter by Rafael Perez MD at      Author:  Rafael Perez MD Service:  (none) Author Type:  (none)    Filed:   Encounter Date:  2017 Status:  (Other)           Trinidad Hunter  [address]  [facility] [address]          2017    Dear Ms. Hunter:    Following are the tests completed during your last clinic visit.  The results of these tests are normal and require no further attention. Next pap smear is due in 5 years.    Pap smear  HPV testing    If you have any further questions or problems contact my office at  [phone]    Thank you,    Leslie LOWERY, RN  Registered Nurse for the OB/Gyn providers of [facility]    Dr Rafael Perez MD, FACOG

## 2018-09-24 ENCOUNTER — TELEPHONE (OUTPATIENT)
Dept: OBGYN | Facility: OTHER | Age: 33
End: 2018-09-24

## 2018-09-24 DIAGNOSIS — O36.80X0 ENCOUNTER TO DETERMINE FETAL VIABILITY OF PREGNANCY, SINGLE OR UNSPECIFIED FETUS: Primary | ICD-10-CM

## 2018-09-24 NOTE — TELEPHONE ENCOUNTER
Patient is scheduled to be seen on 10-9-18 by Dr.D. Perez for an Initial OB Physical. Patient has not had any imaging at this time. Provider prefers an ultrasound to determine dating and viability prior to appointment time.     Order placed per provider preference.    Donna Rizvi ............. 9/24/2018 1:20 PM

## 2018-09-24 NOTE — TELEPHONE ENCOUNTER
Trinidad called to schedule her initial OB visit.  She states her last LMP was around 08/13/18 - 08/17/18.  I scheduled her to see Dr. Perez on 10/09/18.

## 2018-10-08 ENCOUNTER — HOSPITAL ENCOUNTER (OUTPATIENT)
Dept: ULTRASOUND IMAGING | Facility: OTHER | Age: 33
Discharge: HOME | End: 2018-10-08
Attending: OBSTETRICS & GYNECOLOGY | Admitting: OBSTETRICS & GYNECOLOGY
Payer: COMMERCIAL

## 2018-10-08 DIAGNOSIS — O36.80X0 ENCOUNTER TO DETERMINE FETAL VIABILITY OF PREGNANCY, SINGLE OR UNSPECIFIED FETUS: ICD-10-CM

## 2018-10-08 PROCEDURE — 76801 OB US < 14 WKS SINGLE FETUS: CPT

## 2018-10-09 ENCOUNTER — PRENATAL OFFICE VISIT (OUTPATIENT)
Dept: OBGYN | Facility: OTHER | Age: 33
End: 2018-10-09
Attending: OBSTETRICS & GYNECOLOGY
Payer: COMMERCIAL

## 2018-10-09 VITALS
HEART RATE: 72 BPM | HEIGHT: 64 IN | BODY MASS INDEX: 22.8 KG/M2 | WEIGHT: 133.56 LBS | SYSTOLIC BLOOD PRESSURE: 114 MMHG | DIASTOLIC BLOOD PRESSURE: 78 MMHG

## 2018-10-09 DIAGNOSIS — Z23 NEED FOR PROPHYLACTIC VACCINATION AND INOCULATION AGAINST INFLUENZA: ICD-10-CM

## 2018-10-09 DIAGNOSIS — Z34.80 NORMAL PREGNANCY IN MULTIGRAVIDA: Primary | ICD-10-CM

## 2018-10-09 LAB
ABO + RH BLD: NORMAL
ABO + RH BLD: NORMAL
ALBUMIN UR-MCNC: NEGATIVE MG/DL
APPEARANCE UR: CLEAR
BILIRUB UR QL STRIP: NEGATIVE
BLD GP AB SCN SERPL QL: NORMAL
BLOOD BANK CMNT PATIENT-IMP: NORMAL
C TRACH DNA SPEC QL PROBE+SIG AMP: NOT DETECTED
COLOR UR AUTO: YELLOW
ERYTHROCYTE [DISTWIDTH] IN BLOOD BY AUTOMATED COUNT: 13.4 % (ref 10–15)
GLUCOSE UR STRIP-MCNC: NEGATIVE MG/DL
HCT VFR BLD AUTO: 39.9 % (ref 35–47)
HGB BLD-MCNC: 13.3 G/DL (ref 11.7–15.7)
HGB UR QL STRIP: NEGATIVE
KETONES UR STRIP-MCNC: NEGATIVE MG/DL
LEUKOCYTE ESTERASE UR QL STRIP: NEGATIVE
MCH RBC QN AUTO: 27.9 PG (ref 26.5–33)
MCHC RBC AUTO-ENTMCNC: 33.3 G/DL (ref 31.5–36.5)
MCV RBC AUTO: 84 FL (ref 78–100)
N GONORRHOEA DNA SPEC QL PROBE+SIG AMP: NOT DETECTED
NITRATE UR QL: NEGATIVE
PH UR STRIP: 8.5 PH (ref 5–9)
PLATELET # BLD AUTO: 248 10E9/L (ref 150–450)
RBC # BLD AUTO: 4.77 10E12/L (ref 3.8–5.2)
SOURCE: NORMAL
SP GR UR STRIP: 1.02 (ref 1–1.03)
SPECIMEN EXP DATE BLD: NORMAL
SPECIMEN SOURCE: NORMAL
UROBILINOGEN UR STRIP-ACNC: 0.2 EU/DL (ref 0.2–1)
WBC # BLD AUTO: 12.4 10E9/L (ref 4–11)

## 2018-10-09 PROCEDURE — 87389 HIV-1 AG W/HIV-1&-2 AB AG IA: CPT | Performed by: OBSTETRICS & GYNECOLOGY

## 2018-10-09 PROCEDURE — 00000219 ZZHCL STATISTIC OBSA - URINALYSIS: Performed by: OBSTETRICS & GYNECOLOGY

## 2018-10-09 PROCEDURE — 84999 UNLISTED CHEMISTRY PROCEDURE: CPT | Performed by: OBSTETRICS & GYNECOLOGY

## 2018-10-09 PROCEDURE — 86901 BLOOD TYPING SEROLOGIC RH(D): CPT | Performed by: OBSTETRICS & GYNECOLOGY

## 2018-10-09 PROCEDURE — 86780 TREPONEMA PALLIDUM: CPT | Performed by: OBSTETRICS & GYNECOLOGY

## 2018-10-09 PROCEDURE — 81401 MOPATH PROCEDURE LEVEL 2: CPT | Performed by: OBSTETRICS & GYNECOLOGY

## 2018-10-09 PROCEDURE — 87340 HEPATITIS B SURFACE AG IA: CPT | Performed by: OBSTETRICS & GYNECOLOGY

## 2018-10-09 PROCEDURE — 99207 ZZC OB VISIT-NO CHARGE - GICH ONLY: CPT | Performed by: OBSTETRICS & GYNECOLOGY

## 2018-10-09 PROCEDURE — 36415 COLL VENOUS BLD VENIPUNCTURE: CPT | Performed by: OBSTETRICS & GYNECOLOGY

## 2018-10-09 PROCEDURE — 90471 IMMUNIZATION ADMIN: CPT | Performed by: OBSTETRICS & GYNECOLOGY

## 2018-10-09 PROCEDURE — 87491 CHLMYD TRACH DNA AMP PROBE: CPT | Performed by: OBSTETRICS & GYNECOLOGY

## 2018-10-09 PROCEDURE — 90686 IIV4 VACC NO PRSV 0.5 ML IM: CPT | Performed by: OBSTETRICS & GYNECOLOGY

## 2018-10-09 PROCEDURE — 87591 N.GONORRHOEAE DNA AMP PROB: CPT | Performed by: OBSTETRICS & GYNECOLOGY

## 2018-10-09 PROCEDURE — 87086 URINE CULTURE/COLONY COUNT: CPT | Performed by: OBSTETRICS & GYNECOLOGY

## 2018-10-09 PROCEDURE — 86762 RUBELLA ANTIBODY: CPT | Performed by: OBSTETRICS & GYNECOLOGY

## 2018-10-09 PROCEDURE — 86850 RBC ANTIBODY SCREEN: CPT | Performed by: OBSTETRICS & GYNECOLOGY

## 2018-10-09 PROCEDURE — 86900 BLOOD TYPING SEROLOGIC ABO: CPT | Performed by: OBSTETRICS & GYNECOLOGY

## 2018-10-09 PROCEDURE — 85027 COMPLETE CBC AUTOMATED: CPT | Performed by: OBSTETRICS & GYNECOLOGY

## 2018-10-09 PROCEDURE — 81003 URINALYSIS AUTO W/O SCOPE: CPT | Performed by: OBSTETRICS & GYNECOLOGY

## 2018-10-09 ASSESSMENT — PAIN SCALES - GENERAL: PAINLEVEL: NO PAIN (0)

## 2018-10-09 NOTE — PROGRESS NOTES

## 2018-10-09 NOTE — PROGRESS NOTES
"    CC: New OB visit  HPI:  Trinidad Hunter is  at 8w5d based on LMP and confirmed by US.  She notes issues of fatigue.    Obstetric History       T2      L2     SAB0   TAB0   Ectopic0   Multiple0   Live Births2       # Outcome Date GA Lbr Steven/2nd Weight Sex Delivery Anes PTL Lv   3 Current            2 Term 10/24/17   2.693 kg (5 lb 15 oz) M IVD EPI N LADI      Name: Osvaldo Hunter    1 Term 14   2.92 kg (6 lb 7 oz) F  EPI N LADI      Name: Alayna Hunter        STI: (denies HSV, Hep C, Hep B, HIV, Syphilis, Chlamydia, Gonorrhea)  Last pap smear: No results found for: PAP  Chickenpox history: as a child  Past Medical History:   Diagnosis Date     Gastro-esophageal reflux disease without esophagitis     No Comments Provided      has a past surgical history that includes other surgical history and other surgical history.    Social History   Substance Use Topics     Smoking status: Never Smoker     Smokeless tobacco: Never Used     Alcohol use No     History reviewed. No pertinent family history.      Current Outpatient Prescriptions   Medication     Prenatal Vit-Fe Fumarate-FA (PRENATAL MULTIVITAMIN PLUS IRON) 27-0.8 MG TABS per tablet     No current facility-administered medications for this visit.      Allergies   Allergen Reactions     Sulfa Drugs Rash       There is no immunization history on file for this patient.        REVIEW OF SYSTEMS  General: negative  Resp: No shortness of breath, dyspnea on exertion, cough, or hemoptysis  CV: negative  GI: negative  : negative  Musculoskeletal: negative  Neurologic: negative  Psychiatric: negative  Hematologic: negative  Endocrine: negative    EXAM: /78 (BP Location: Right arm, Patient Position: Sitting, Cuff Size: Adult Regular)  Pulse 72  Ht 1.626 m (5' 4\")  Wt 60.6 kg (133 lb 9 oz)  LMP   Breastfeeding? No  BMI 22.93 kg/m2  Gen: NAD  CV: RRR with normal S1, S2, no GRM  Resp: CTA Bilaterally  Breasts: normal without suspicious " masses, skin changes or axillary nodes, symmetric fibrous changes in both upper outer quadrants.  Abdomen: NT, ND  Pelvic exam: normal vagina and vulva, normal cervix without lesions or tenderness, exam chaperoned by nurse.  Extremities: No TTP, no deformity  Neuro: CN II-XII intact grossly, moves all extremities  Psych: normal affect and mentation.    I/P  (Z34.80) Normal pregnancy in multigravida  (primary encounter diagnosis)  Comment:   Plan: ABO/Rh type and screen, CBC with platelets,         Hepatitis B surface antigen, HIV Antigen         Antibody Combo, Rubella Antibody IgG         Quantitative, Treponema Abs w Reflex to RPR and        Titer, Urine Culture Aerobic Bacterial,         GC/Chlamydia by PCR - HI,GH, *UA reflex to         Microscopic, SMA screen: Laboratory         Miscellaneous Order            (Z23) Need for prophylactic vaccination and inoculation against influenza    Discussed safety, nutrition, screening for cystic fibrosis, spina bifida, spinal muscular atrophy, quad screen, cffDNA screening as appropriate.  F/U scheduled, discussed call schedule rotation with [facility] and Dr. Stoner, general surgery.  Return visit in 1 month     Pregnancy risk factors include:  History of rapid delivery (fast second stage)   ( x 2)    Rafael Perez MD FACOG  1:26 PM 10/9/2018

## 2018-10-09 NOTE — MR AVS SNAPSHOT
"              After Visit Summary   10/9/2018    Trinidad Hunter    MRN: [id number]           Patient Information     Date Of Birth          1985        Visit Information        Provider Department      10/9/2018 1:00 PM Rafael Perez MD [facility]        Today's Diagnoses     Normal pregnancy in multigravida    -  1    Need for prophylactic vaccination and inoculation against influenza           Follow-ups after your visit        Who to contact     If you have questions or need follow up information about today's clinic visit or your schedule please contact [facility] directly at [phone].  Normal or non-critical lab and imaging results will be communicated to you by PacketVideohart, letter or phone within 4 business days after the clinic has received the results. If you do not hear from us within 7 days, please contact the clinic through PacketVideohart or phone. If you have a critical or abnormal lab result, we will notify you by phone as soon as possible.  Submit refill requests through DropShip or call your pharmacy and they will forward the refill request to us. Please allow 3 business days for your refill to be completed.          Additional Information About Your Visit        MyChart Information     DropShip lets you send messages to your doctor, view your test results, renew your prescriptions, schedule appointments and more. To sign up, go to [email or web address] . Click on \"Log in\" on the left side of the screen, which will take you to the Welcome page. Then click on \"Sign up Now\" on the right side of the page.     You will be asked to enter the access code listed below, as well as some personal information. Please follow the directions to create your username and password.     Your access code is: [email or web address]  Expires: 2019 11:10 AM     Your access code will  in 90 days. If you need help or a new code, please call your [facility] clinic or [phone].      " "  Care EveryWhere ID     This is your Care EveryWhere ID. This could be used by other organizations to access your [place] medical records  [id number]        Your Vitals Were     Pulse Height Breastfeeding? BMI (Body Mass Index)          72 1.626 m (5' 4\") No 22.93 kg/m2         Blood Pressure from Last 3 Encounters:   10/09/18 114/78   12/01/17 108/74   10/19/17 122/78    Weight from Last 3 Encounters:   10/09/18 60.6 kg (133 lb 9 oz)   12/01/17 67 kg (147 lb 9.6 oz)   10/19/17 75.8 kg (167 lb 3.2 oz)              We Performed the Following     *UA reflex to Microscopic     ABO/Rh type and screen     ARUP Miscellaneous Test     CBC with platelets     GC/Chlamydia by PCR - HI,GH     GH IMM-  HC FLU VAC PRESRV FREE QUAD SPLIT VIR 3+YRS IM     Hepatitis B surface antigen     HIV Antigen Antibody Combo     Rubella Antibody IgG Quantitative     Treponema Abs w Reflex to RPR and Titer     Urine Culture Aerobic Bacterial        Primary Care Provider Office Phone # Fax #    Nighat LEYLA Magdaleno [phone] [address]  [facility] [address]        Equal Access to Services     EDWIGE MA AH: Hadii aad ku hadasho Soomaali, waaxda luqadaha, qaybta kaalmada adeegyada, liss dickeyn lisa chang . So [facility] [phone].    ATENCIÓN: Si habla español, tiene a salguero disposición servicios gratuitos de asistencia lingüística. Sami al [phone].    We comply with applicable federal civil rights laws and [place] laws. We do not discriminate on the basis of race, color, national origin, age, disability, sex, sexual orientation, or gender identity.            Thank you!     Thank you for choosing [facility]  for your care. Our goal is always to provide you with excellent care. Hearing back from our patients is one way we can continue to improve our services. Please take a few minutes to complete the written survey that you may receive in the mail after your visit with " us. Thank you!             Your Updated Medication List - Protect others around you: Learn how to safely use, store and throw away your medicines at www.disposemymeds.org.          This list is accurate as of 10/9/18  5:02 PM.  Always use your most recent med list.                   Brand Name Dispense Instructions for use Diagnosis    prenatal multivitamin plus iron 27-0.8 MG Tabs per tablet      Take 1 tablet by mouth daily

## 2018-10-10 LAB
BACTERIA SPEC CULT: NORMAL
SPECIMEN SOURCE: NORMAL

## 2018-10-11 LAB
HBV SURFACE AG SERPL QL IA: NONREACTIVE
HIV 1+2 AB+HIV1 P24 AG SERPL QL IA: NONREACTIVE
RUBV IGG SERPL IA-ACNC: 27 IU/ML
T PALLIDUM AB SER QL: NONREACTIVE

## 2018-10-19 LAB
RESULT: NORMAL
SEND OUTS MISC TEST CODE: NORMAL
SEND OUTS MISC TEST SPECIMEN: NORMAL
TEST NAME: NORMAL

## 2018-11-08 ENCOUNTER — PRENATAL OFFICE VISIT (OUTPATIENT)
Dept: OBGYN | Facility: OTHER | Age: 33
End: 2018-11-08
Attending: OBSTETRICS & GYNECOLOGY
Payer: COMMERCIAL

## 2018-11-08 VITALS
WEIGHT: 135.4 LBS | BODY MASS INDEX: 23.24 KG/M2 | SYSTOLIC BLOOD PRESSURE: 126 MMHG | DIASTOLIC BLOOD PRESSURE: 76 MMHG | HEART RATE: 82 BPM

## 2018-11-08 DIAGNOSIS — Z34.90 NORMAL PREGNANCY, ANTEPARTUM: Primary | ICD-10-CM

## 2018-11-08 PROCEDURE — 99207 ZZC OB VISIT-NO CHARGE - GICH ONLY: CPT | Performed by: OBSTETRICS & GYNECOLOGY

## 2018-11-08 ASSESSMENT — PAIN SCALES - GENERAL: PAINLEVEL: NO PAIN (0)

## 2018-11-08 NOTE — MR AVS SNAPSHOT
After Visit Summary   11/8/2018    Trinidad Hunter    MRN: [id number]           Patient Information     Date Of Birth          1985        Visit Information        Provider Department      11/8/2018 2:15 PM Rafael Perez MD [facility]        Today's Diagnoses     Normal pregnancy, antepartum    -  1       Follow-ups after your visit        Follow-up notes from your care team     Return in about 4 weeks (around 12/6/2018).      Your next 10 appointments already scheduled     Dec 06, 2018  4:00 PM CST   ESTABLISHED PRENATAL with Rafael Perez MD   [facility] ([facility])    [address]  [place] [address] [phone]              Who to contact     If you have questions or need follow up information about today's clinic visit or your schedule please contact [facility] directly at [phone].  Normal or non-critical lab and imaging results will be communicated to you by Hexagram 49hart, letter or phone within 4 business days after the clinic has received the results. If you do not hear from us within 7 days, please contact the clinic through Hexagram 49hart or phone. If you have a critical or abnormal lab result, we will notify you by phone as soon as possible.  Submit refill requests through Browntape or call your pharmacy and they will forward the refill request to us. Please allow 3 business days for your refill to be completed.          Additional Information About Your Visit        Hexagram 49hart Information     Browntape gives you secure access to your electronic health record. If you see a primary care provider, you can also send messages to your care team and make appointments. If you have questions, please call your primary care clinic.  If you do not have a primary care provider, please call [phone] and they will assist you.        Care EveryWhere ID     This is your Care EveryWhere ID. This could be  used by other organizations to access your [place] medical records  [id number]        Your Vitals Were     Pulse BMI (Body Mass Index)                82 23.24 kg/m2           Blood Pressure from Last 3 Encounters:   11/08/18 126/76   10/09/18 114/78   12/01/17 108/74    Weight from Last 3 Encounters:   11/08/18 61.4 kg (135 lb 6.4 oz)   10/09/18 60.6 kg (133 lb 9 oz)   12/01/17 67 kg (147 lb 9.6 oz)              Today, you had the following     No orders found for display       Primary Care Provider Office Phone # Fax #    Nighat Magdaleno, [phone] [address] [facility] [address]        Equal Access to Services     GEORGE MA : Hadii juan c randolpho Sonabila, waaxda luqadaha, qaybta kaalmada adeegyada, liss chang . So [facility] [place] [phone].    ATENCIÓN: Si habla español, tiene a salguero disposición servicios gratuitos de asistencia lingüística. Llame al [phone].    We comply with applicable federal civil rights laws and [place] laws. We do not discriminate on the basis of race, color, national origin, age, disability, sex, sexual orientation, or gender identity.            Thank you!     Thank you for choosing [facility]  for your care. Our goal is always to provide you with excellent care. Hearing back from our patients is one way we can continue to improve our services. Please take a few minutes to complete the written survey that you may receive in the mail after your visit with us. Thank you!             Your Updated Medication List - Protect others around you: Learn how to safely use, store and throw away your medicines at www.disposemymeds.org.          This list is accurate as of 11/8/18  3:06 PM.  Always use your most recent med list.                   Brand Name Dispense Instructions for use Diagnosis    prenatal multivitamin plus iron 27-0.8 MG Tabs per tablet      Take 1 tablet by mouth daily

## 2018-11-08 NOTE — PROGRESS NOTES
CC: Recheck OB visit at 13w0d    HPI: Trinidad Hunter presents for a routine OB visit now at 13w0d  She has no concerns. Denies cramping, bleeding.    Obstetric History       T2      L2     SAB0   TAB0   Ectopic0   Multiple0   Live Births2       # Outcome Date GA Lbr Steven/2nd Weight Sex Delivery Anes PTL Lv   3 Current            2 Term 10/24/17   2.693 kg (5 lb 15 oz) M IVD EPI N LADI      Name: Osvaldo Hunter    1 Term 14   2.92 kg (6 lb 7 oz) F  EPI N LADI      Name: Alayna Hunter        Current Outpatient Prescriptions   Medication     Prenatal Vit-Fe Fumarate-FA (PRENATAL MULTIVITAMIN PLUS IRON) 27-0.8 MG TABS per tablet     No current facility-administered medications for this visit.          O: /76 (BP Location: Right arm)  Pulse 82  Wt 61.4 kg (135 lb 6.4 oz)  LMP   BMI 23.24 kg/m2  Body mass index is 23.24 kg/(m^2).  See OB flow sheet  EXAM:  NAD  FHT's: 150    Results for orders placed or performed in visit on 10/09/18   CBC with platelets   Result Value Ref Range    WBC 12.4 (H) 4.0 - 11.0 10e9/L    RBC Count 4.77 3.8 - 5.2 10e12/L    Hemoglobin 13.3 11.7 - 15.7 g/dL    Hematocrit 39.9 35.0 - 47.0 %    MCV 84 78 - 100 fl    MCH 27.9 26.5 - 33.0 pg    MCHC 33.3 31.5 - 36.5 g/dL    RDW 13.4 10.0 - 15.0 %    Platelet Count 248 150 - 450 10e9/L   Hepatitis B surface antigen   Result Value Ref Range    Hep B Surface Agn Nonreactive NR^Nonreactive   HIV Antigen Antibody Combo   Result Value Ref Range    HIV Antigen Antibody Combo Nonreactive NR^Nonreactive       Rubella Antibody IgG Quantitative   Result Value Ref Range    Rubella Antibody IgG Quantitative 27 IU/mL   Treponema Abs w Reflex to RPR and Titer   Result Value Ref Range    Treponema Antibodies Nonreactive NR^Nonreactive   *UA reflex to Microscopic   Result Value Ref Range    Color Urine Yellow     Appearance Urine Clear     Glucose Urine Negative NEG^Negative mg/dL    Bilirubin Urine Negative NEG^Negative    Ketones  Urine Negative NEG^Negative mg/dL    Specific Gravity Urine 1.020 1.000 - 1.030    Blood Urine Negative NEG^Negative    pH Urine 8.5 5.0 - 9.0 pH    Protein Albumin Urine Negative NEG^Negative mg/dL    Urobilinogen Urine 0.2 0.2 - 1.0 EU/dL    Nitrite Urine Negative NEG^Negative    Leukocyte Esterase Urine Negative NEG^Negative    Source Midstream Urine    ARUP Miscellaneous Test   Result Value Ref Range    Result SEE NOTE     Test Name       SPINAL MUSCULAR ATROPHY (SMA) PRENATAL SCREEN COPY NUMBER ANALYSIS    Send Outs Misc Test Code 8573092     Send Outs Misc Test Specimen Whole blood, EDTA anticoagulant    ABO/Rh type and screen   Result Value Ref Range    ABO O     RH(D) Pos     Antibody Screen Neg     Test Valid Only At [facility]        Specimen Expires 10/12/2018    Urine Culture Aerobic Bacterial   Result Value Ref Range    Specimen Description Midstream Urine     Culture Micro       <10,000 colonies/mL  mixed urogenital jennifer  No further identification or sensitivity done     GC/Chlamydia by PCR - HI,GH   Result Value Ref Range    Specimen Source Swab     Neisseria gonorrhoreae PCR Not Detected NDET^Not Detected    Chlamydia Trachomatis PCR Not Detected NDET^Not Detected       A/P: 13w0d gestation    Recheck in 4 weeks    Pregnancy risk factors include:  History of rapid delivery (fast second stage)   ( x 2)      Rafael Perez MD FACOG  2:42 PM 2018

## 2018-12-04 ENCOUNTER — PRENATAL OFFICE VISIT (OUTPATIENT)
Dept: OBGYN | Facility: OTHER | Age: 33
End: 2018-12-04
Attending: OBSTETRICS & GYNECOLOGY
Payer: COMMERCIAL

## 2018-12-04 VITALS
HEART RATE: 76 BPM | DIASTOLIC BLOOD PRESSURE: 78 MMHG | SYSTOLIC BLOOD PRESSURE: 114 MMHG | WEIGHT: 138.4 LBS | BODY MASS INDEX: 23.76 KG/M2

## 2018-12-04 DIAGNOSIS — Z34.92 NORMAL PREGNANCY IN SECOND TRIMESTER: Primary | ICD-10-CM

## 2018-12-04 PROCEDURE — 99207 ZZC OB VISIT-NO CHARGE - GICH ONLY: CPT | Performed by: OBSTETRICS & GYNECOLOGY

## 2018-12-04 ASSESSMENT — PAIN SCALES - GENERAL: PAINLEVEL: NO PAIN (0)

## 2018-12-04 NOTE — PROGRESS NOTES
CC: Recheck OB visit at 16w5d    HPI: Trinidad Hunter presents for a routine OB visit now at 16w5d  She has no concerns. Denies cramping, bleeding, normal fetal movement.    Obstetric History       T2      L2     SAB0   TAB0   Ectopic0   Multiple0   Live Births2       # Outcome Date GA Lbr Steven/2nd Weight Sex Delivery Anes PTL Lv   3 Current            2 Term 10/24/17   2.693 kg (5 lb 15 oz) M IVD EPI N LADI      Name: Osvaldo Hunter    1 Term 14   2.92 kg (6 lb 7 oz) F  EPI N LADI      Name: Alayna Hunter        Current Outpatient Prescriptions   Medication     Prenatal Vit-Fe Fumarate-FA (PRENATAL MULTIVITAMIN PLUS IRON) 27-0.8 MG TABS per tablet     No current facility-administered medications for this visit.          O: LMP   There is no height or weight on file to calculate BMI.  See OB flow sheet  EXAM:  NAD  FHT: 145    Results for orders placed or performed in visit on 10/09/18   CBC with platelets   Result Value Ref Range    WBC 12.4 (H) 4.0 - 11.0 10e9/L    RBC Count 4.77 3.8 - 5.2 10e12/L    Hemoglobin 13.3 11.7 - 15.7 g/dL    Hematocrit 39.9 35.0 - 47.0 %    MCV 84 78 - 100 fl    MCH 27.9 26.5 - 33.0 pg    MCHC 33.3 31.5 - 36.5 g/dL    RDW 13.4 10.0 - 15.0 %    Platelet Count 248 150 - 450 10e9/L   Hepatitis B surface antigen   Result Value Ref Range    Hep B Surface Agn Nonreactive NR^Nonreactive   HIV Antigen Antibody Combo   Result Value Ref Range    HIV Antigen Antibody Combo Nonreactive NR^Nonreactive       Rubella Antibody IgG Quantitative   Result Value Ref Range    Rubella Antibody IgG Quantitative 27 IU/mL   Treponema Abs w Reflex to RPR and Titer   Result Value Ref Range    Treponema Antibodies Nonreactive NR^Nonreactive   *UA reflex to Microscopic   Result Value Ref Range    Color Urine Yellow     Appearance Urine Clear     Glucose Urine Negative NEG^Negative mg/dL    Bilirubin Urine Negative NEG^Negative    Ketones Urine Negative NEG^Negative mg/dL    Specific Gravity  Urine 1.020 1.000 - 1.030    Blood Urine Negative NEG^Negative    pH Urine 8.5 5.0 - 9.0 pH    Protein Albumin Urine Negative NEG^Negative mg/dL    Urobilinogen Urine 0.2 0.2 - 1.0 EU/dL    Nitrite Urine Negative NEG^Negative    Leukocyte Esterase Urine Negative NEG^Negative    Source Midstream Urine    ARUP Miscellaneous Test   Result Value Ref Range    Result SEE NOTE     Test Name       SPINAL MUSCULAR ATROPHY (SMA) PRENATAL SCREEN COPY NUMBER ANALYSIS    Send Outs Misc Test Code [id number]     Send Outs Misc Test Specimen Whole blood, EDTA anticoagulant    ABO/Rh type and screen   Result Value Ref Range    ABO O     RH(D) Pos     Antibody Screen Neg     Test Valid Only At [facility]        Specimen Expires 10/12/2018    Urine Culture Aerobic Bacterial   Result Value Ref Range    Specimen Description Midstream Urine     Culture Micro       <10,000 colonies/mL  mixed urogenital jennifer  No further identification or sensitivity done     GC/Chlamydia by PCR - HI,GH   Result Value Ref Range    Specimen Source Swab     Neisseria gonorrhoreae PCR Not Detected NDET^Not Detected    Chlamydia Trachomatis PCR Not Detected NDET^Not Detected       A/P: 16w5d gestation    Recheck in 4 weeks    Pregnancy risk factors include:  History of rapid delivery (fast second stage)   ( x 2)    Rafael Perez MD FACOG  9:07 AM 2018

## 2018-12-04 NOTE — MR AVS SNAPSHOT
After Visit Summary   12/4/2018    Trinidad Hunter    MRN: [id number]           Patient Information     Date Of Birth          1985        Visit Information        Provider Department      12/4/2018 9:00 AM Rafael Perez MD [facility]        Today's Diagnoses     Normal pregnancy in second trimester    -  1       Follow-ups after your visit        Follow-up notes from your care team     Return in about 4 weeks (around 1/1/2019).      Your next 10 appointments already scheduled     Jan 03, 2019  9:45 AM CST   (Arrive by 9:30 AM)   US OB >14 WEEKS with GHUS1   [facility] ([facility])    [address]  [place] [address] [phone]           How do I prepare for my exam? (Food and drink instructions) Drink four 8-ounce glasses of fluid. Finish drinking an hour before your exam, so you have a full bladder. If you need to empty your bladder before your exam, try to release only a little urine. Then, drink another glass of fluid.  How do I prepare for my exam? (Other instructions) You may have up to two family members in the exam room. If you bring a small child, an adult must be there to care for him or her. No video or camera photography during the procedure.  What should I wear: Wear comfortable clothes.  How long does the exam take: Most ultrasounds take 30 to 60 minutes.  What should I bring: Bring a list of your medicines, including vitamins, minerals and over-the-counter drugs. It is safest to leave personal items at home.  Do I need a :  No  is needed.  What do I need to tell my doctor: Tell your doctor about any allergies you may have.  What should I do after the exam: No restrictions, you may resume normal activities.  What is this test: An ultrasound uses sound waves to make pictures of the body. Sound waves do not cause pain. The only discomfort may be the pressure of the wand against your skin or  full bladder.  Who should I call with questions: If you have any questions, please call the Imaging Department where you will have your exam. Directions, parking instructions, and other information are available on our website, [email or web address].            Jan 03, 2019 10:45 AM CST   ESTABLISHED PRENATAL with Rafael Perez MD   [facility]    [address]  [place] [address] [phone]              Future tests that were ordered for you today     Open Future Orders        Priority Expected Expires Ordered    US OB > 14 Weeks Routine  12/4/2019 12/4/2018            Who to contact     If you have questions or need follow up information about today's clinic visit or your schedule please contact [facility] directly at [phone].  Normal or non-critical lab and imaging results will be communicated to you by Daiohart, letter or phone within 4 business days after the clinic has received the results. If you do not hear from us within 7 days, please contact the clinic through Daiohart or phone. If you have a critical or abnormal lab result, we will notify you by phone as soon as possible.  Submit refill requests through Globel Direct or call your pharmacy and they will forward the refill request to us. Please allow 3 business days for your refill to be completed.          Additional Information About Your Visit        Daiohart Information     Globel Direct gives you secure access to your electronic health record. If you see a primary care provider, you can also send messages to your care team and make appointments. If you have questions, please call your primary care clinic.  If you do not have a primary care provider, please call [phone] and they will assist you.        Care EveryWhere ID     This is your Care EveryWhere ID. This could be used by other organizations to access your [place] medical records  [id number]        Your  Vitals Were     Pulse BMI (Body Mass Index)                76 23.76 kg/m2           Blood Pressure from Last 3 Encounters:   12/04/18 114/78   11/08/18 126/76   10/09/18 114/78    Weight from Last 3 Encounters:   12/04/18 62.8 kg (138 lb 6.4 oz)   11/08/18 61.4 kg (135 lb 6.4 oz)   10/09/18 60.6 kg (133 lb 9 oz)               Primary Care Provider Office Phone # Fax #    Nighat Magdaleno [phone] [address] [facility] [address]        Equal Access to Services     [facility]: Hadii juan c Galeano, waitaliada mervat, qaybta kaalmada priyanka, liss chang . So [facility] [phone].    ATENCIÓN: Si habla español, tiene a salguero disposición servicios gratuitos de asistencia lingüística. [facility] [phone].    We comply with applicable federal civil rights laws and [place] laws. We do not discriminate on the basis of race, color, national origin, age, disability, sex, sexual orientation, or gender identity.            Thank you!     Thank you for choosing [facility]  for your care. Our goal is always to provide you with excellent care. Hearing back from our patients is one way we can continue to improve our services. Please take a few minutes to complete the written survey that you may receive in the mail after your visit with us. Thank you!             Your Updated Medication List - Protect others around you: Learn how to safely use, store and throw away your medicines at www.disposemymeds.org.          This list is accurate as of 12/4/18  9:19 AM.  Always use your most recent med list.                   Brand Name Dispense Instructions for use Diagnosis    prenatal multivitamin w/iron 27-0.8 MG tablet      Take 1 tablet by mouth daily

## 2019-01-03 ENCOUNTER — HOSPITAL ENCOUNTER (OUTPATIENT)
Dept: ULTRASOUND IMAGING | Facility: OTHER | Age: 34
Discharge: HOME | End: 2019-01-03
Attending: OBSTETRICS & GYNECOLOGY | Admitting: OBSTETRICS & GYNECOLOGY
Payer: COMMERCIAL

## 2019-01-03 ENCOUNTER — PRENATAL OFFICE VISIT (OUTPATIENT)
Dept: OBGYN | Facility: OTHER | Age: 34
End: 2019-01-03
Attending: OBSTETRICS & GYNECOLOGY
Payer: COMMERCIAL

## 2019-01-03 VITALS
SYSTOLIC BLOOD PRESSURE: 120 MMHG | HEART RATE: 64 BPM | DIASTOLIC BLOOD PRESSURE: 70 MMHG | BODY MASS INDEX: 24.73 KG/M2 | WEIGHT: 144.1 LBS

## 2019-01-03 DIAGNOSIS — Z34.90 NORMAL PREGNANCY, ANTEPARTUM: Primary | ICD-10-CM

## 2019-01-03 DIAGNOSIS — Z34.92 NORMAL PREGNANCY IN SECOND TRIMESTER: ICD-10-CM

## 2019-01-03 PROCEDURE — 99207 ZZC OB VISIT-NO CHARGE - GICH ONLY: CPT | Performed by: OBSTETRICS & GYNECOLOGY

## 2019-01-03 PROCEDURE — 76805 OB US >/= 14 WKS SNGL FETUS: CPT

## 2019-01-03 RX ORDER — LANSOPRAZOLE 30 MG/1
30 CAPSULE, DELAYED RELEASE ORAL
COMMUNITY
Start: 2017-12-14 | End: 2019-04-15

## 2019-01-03 RX ORDER — FERROUS SULFATE 325(65) MG
325 TABLET, DELAYED RELEASE (ENTERIC COATED) ORAL
COMMUNITY
Start: 2017-11-08 | End: 2019-06-20

## 2019-01-03 ASSESSMENT — PAIN SCALES - GENERAL: PAINLEVEL: NO PAIN (0)

## 2019-01-03 NOTE — NURSING NOTE
Patient presents to the clinic for her OB visit. She is 21w0d.  Medication Reconciliation Completed.    Linwood Mckay LPN  1/3/2019 10:47 AM

## 2019-01-03 NOTE — PROGRESS NOTES
CC: Recheck OB visit at 21w0d    HPI: Trinidad Hunter presents for a routine OB visit now at 21w0d  She has no concerns. Denies cramping, bleeding, normal fetal movement.     Obstetric History       T2      L2     SAB0   TAB0   Ectopic0   Multiple0   Live Births2       # Outcome Date GA Lbr Steven/2nd Weight Sex Delivery Anes PTL Lv   3 Current            2 Term 10/24/17   2.693 kg (5 lb 15 oz) M IVD EPI N LADI      Name: Osvaldo Hunter    1 Term 14   2.92 kg (6 lb 7 oz) F  EPI N LADI      Name: Alayna Hunter        Current Outpatient Medications   Medication     ferrous sulfate (FE TABS) 325 (65 Fe) MG EC tablet     IRON PO     LANsoprazole (PREVACID) 30 MG DR capsule     No current facility-administered medications for this visit.          O: /70 (BP Location: Right arm, Patient Position: Sitting, Cuff Size: Adult Large)   Pulse 64   Wt 65.4 kg (144 lb 1.6 oz)   Breastfeeding? No   BMI 24.73 kg/m    Body mass index is 24.73 kg/m .  See OB flow sheet  EXAM:  NAD  US pending    Results for orders placed or performed in visit on 10/09/18   CBC with platelets   Result Value Ref Range    WBC 12.4 (H) 4.0 - 11.0 10e9/L    RBC Count 4.77 3.8 - 5.2 10e12/L    Hemoglobin 13.3 11.7 - 15.7 g/dL    Hematocrit 39.9 35.0 - 47.0 %    MCV 84 78 - 100 fl    MCH 27.9 26.5 - 33.0 pg    MCHC 33.3 31.5 - 36.5 g/dL    RDW 13.4 10.0 - 15.0 %    Platelet Count 248 150 - 450 10e9/L   Hepatitis B surface antigen   Result Value Ref Range    Hep B Surface Agn Nonreactive NR^Nonreactive   HIV Antigen Antibody Combo   Result Value Ref Range    HIV Antigen Antibody Combo Nonreactive NR^Nonreactive       Rubella Antibody IgG Quantitative   Result Value Ref Range    Rubella Antibody IgG Quantitative 27 IU/mL   Treponema Abs w Reflex to RPR and Titer   Result Value Ref Range    Treponema Antibodies Nonreactive NR^Nonreactive   *UA reflex to Microscopic   Result Value Ref Range    Color Urine Yellow     Appearance Urine  Clear     Glucose Urine Negative NEG^Negative mg/dL    Bilirubin Urine Negative NEG^Negative    Ketones Urine Negative NEG^Negative mg/dL    Specific Gravity Urine 1.020 1.000 - 1.030    Blood Urine Negative NEG^Negative    pH Urine 8.5 5.0 - 9.0 pH    Protein Albumin Urine Negative NEG^Negative mg/dL    Urobilinogen Urine 0.2 0.2 - 1.0 EU/dL    Nitrite Urine Negative NEG^Negative    Leukocyte Esterase Urine Negative NEG^Negative    Source Midstream Urine    [facility] Miscellaneous Test   Result Value Ref Range    Result SEE NOTE     Test Name       SPINAL MUSCULAR ATROPHY (SMA) PRENATAL SCREEN COPY NUMBER ANALYSIS    Send Outs Misc Test Code 2,013,436     Send Outs Misc Test Specimen Whole blood, EDTA anticoagulant    ABO/Rh type and screen   Result Value Ref Range    ABO O     RH(D) Pos     Antibody Screen Neg     Test Valid Only At [facility]        Specimen Expires 10/12/2018    Urine Culture Aerobic Bacterial   Result Value Ref Range    Specimen Description Midstream Urine     Culture Micro       <10,000 colonies/mL  mixed urogenital jennifer  No further identification or sensitivity done     GC/Chlamydia by PCR - [facility]   Result Value Ref Range    Specimen Source Swab     Neisseria gonorrhoreae PCR Not Detected NDET^Not Detected    Chlamydia Trachomatis PCR Not Detected NDET^Not Detected       A/P: 21w0d gestation    Recheck in 4 weeks    Pregnancy risk factors include:  History of rapid delivery (fast second stage)   ( x 2)       Rafael Perez MD FACOG  10:55 AM 1/3/2019

## 2019-01-31 ENCOUNTER — PRENATAL OFFICE VISIT (OUTPATIENT)
Dept: OBGYN | Facility: OTHER | Age: 34
End: 2019-01-31
Attending: OBSTETRICS & GYNECOLOGY
Payer: COMMERCIAL

## 2019-01-31 VITALS
DIASTOLIC BLOOD PRESSURE: 70 MMHG | RESPIRATION RATE: 16 BRPM | HEART RATE: 60 BPM | BODY MASS INDEX: 25.4 KG/M2 | SYSTOLIC BLOOD PRESSURE: 118 MMHG | WEIGHT: 148 LBS

## 2019-01-31 DIAGNOSIS — Z34.90 NORMAL PREGNANCY, ANTEPARTUM: Primary | ICD-10-CM

## 2019-01-31 LAB
GLUCOSE 1H P 50 G GLC PO SERPL-MCNC: 91 MG/DL (ref 60–129)
HGB BLD-MCNC: 12.8 G/DL (ref 11.7–15.7)

## 2019-01-31 PROCEDURE — 86780 TREPONEMA PALLIDUM: CPT | Performed by: OBSTETRICS & GYNECOLOGY

## 2019-01-31 PROCEDURE — 85018 HEMOGLOBIN: CPT | Performed by: OBSTETRICS & GYNECOLOGY

## 2019-01-31 PROCEDURE — 82950 GLUCOSE TEST: CPT | Performed by: OBSTETRICS & GYNECOLOGY

## 2019-01-31 PROCEDURE — 99207 ZZC OB VISIT-NO CHARGE - GICH ONLY: CPT | Performed by: OBSTETRICS & GYNECOLOGY

## 2019-01-31 PROCEDURE — 36415 COLL VENOUS BLD VENIPUNCTURE: CPT | Performed by: OBSTETRICS & GYNECOLOGY

## 2019-01-31 ASSESSMENT — PAIN SCALES - GENERAL: PAINLEVEL: NO PAIN (0)

## 2019-01-31 NOTE — NURSING NOTE
Chief Complaint   Patient presents with     Prenatal Care     25W0D        Medication Reconciliation: completed   Sushila Dorantes LPN  1/31/2019 9:48 AM

## 2019-01-31 NOTE — PROGRESS NOTES
CC: Recheck OB visit at 25w0d    HPI: Trinidad Hunter presents for a routine OB visit now at 25w0d  She has no concerns. Denies cramping, bleeding, normal fetal movement    Obstetric History       T2      L2     SAB0   TAB0   Ectopic0   Multiple0   Live Births2       # Outcome Date GA Lbr Steven/2nd Weight Sex Delivery Anes PTL Lv   3 Current            2 Term 10/24/17   2.693 kg (5 lb 15 oz) M IVD EPI N LADI      Name: Osvaldo Hunter    1 Term 14   2.92 kg (6 lb 7 oz) F  EPI N LADI      Name: Alayna Hunter        Current Outpatient Medications   Medication     ferrous sulfate (FE TABS) 325 (65 Fe) MG EC tablet     IRON PO     LANsoprazole (PREVACID) 30 MG DR capsule     No current facility-administered medications for this visit.          O: /70 (BP Location: Right arm, Patient Position: Sitting, Cuff Size: Adult Large)   Pulse 60   Resp 16   Wt 67.1 kg (148 lb)   Breastfeeding? No   BMI 25.40 kg/m    Body mass index is 25.4 kg/m .  See OB flow sheet  EXAM:  NAD  FH: 25.5   FHT: 140's    Results for orders placed or performed during the hospital encounter of 19   US OB > 14 Weeks    Narrative    OB ULTRASOUND REPORT     Clinical history:  Normal pregnancy in second trimester     Gestation:  1  Presentation: Cephalic  Lie:  Oblique    Cardiac Activity:  Regular  BPM:  147  Movement:  Yes    Placenta: Posterior  stGstrstastdstest:st st1st Previa:  No Previa      Cervix:  4.8 cm in length    RADHA:  15.8 cm    Measurements:    BPD:  21 weeks 0 days  HC:  21 weeks 0 days  AC:  20 weeks 6 days  FL:  21 weeks 0 days    Estimated Fetal Weight:  383 grams  HC/AC:  1.18    US age:  21 weeks 0 days  Gestational Age by LMP:  21 weeks 0 days  US EDC (Current Study):  2019   %WT for EGA  (Based on First Study):  42 %    Structural Survey:    Head:  Unremarkable  Spine:  Unremarkable  Stomach:  Unremarkable  Kidneys:  Unremarkable  Bladder:  Unremarkable  3 Vessel Cord:  Unremarkable  Cord Insertion:   Unremarkable  4 Chamber Heart, RVOT, LVOT:  Unremarkable  Ant. Abd Wall:  Unremarkable  Diaphragm:  Unremarkable  Situs: Unremarkable          Impression    Impression: Unremarkable fetal anatomy screening.      KIRSTY COOPER MD       A/P: 25w0d gestation    Recheck in 4 weeks    Pregnancy risk factors include:  History of rapid delivery (fast second stage)   ( x 2)    Rafael Perez MD FACOG  10:05 AM 2019

## 2019-02-01 LAB — T PALLIDUM AB SER QL: NONREACTIVE

## 2019-02-04 ENCOUNTER — OFFICE VISIT (OUTPATIENT)
Dept: FAMILY MEDICINE | Facility: OTHER | Age: 34
End: 2019-02-04
Attending: NURSE PRACTITIONER
Payer: COMMERCIAL

## 2019-02-04 ENCOUNTER — MYC MEDICAL ADVICE (OUTPATIENT)
Dept: OBGYN | Facility: OTHER | Age: 34
End: 2019-02-04

## 2019-02-04 VITALS
RESPIRATION RATE: 16 BRPM | DIASTOLIC BLOOD PRESSURE: 66 MMHG | TEMPERATURE: 99 F | SYSTOLIC BLOOD PRESSURE: 114 MMHG | WEIGHT: 152.6 LBS | BODY MASS INDEX: 26.19 KG/M2 | HEART RATE: 84 BPM

## 2019-02-04 DIAGNOSIS — J02.0 STREP THROAT: ICD-10-CM

## 2019-02-04 DIAGNOSIS — J02.9 SORE THROAT: Primary | ICD-10-CM

## 2019-02-04 LAB
DEPRECATED S PYO AG THROAT QL EIA: ABNORMAL
SPECIMEN SOURCE: ABNORMAL

## 2019-02-04 PROCEDURE — 87880 STREP A ASSAY W/OPTIC: CPT | Performed by: NURSE PRACTITIONER

## 2019-02-04 PROCEDURE — 99214 OFFICE O/P EST MOD 30 MIN: CPT | Performed by: NURSE PRACTITIONER

## 2019-02-04 RX ORDER — AMOXICILLIN 500 MG/1
1000 CAPSULE ORAL DAILY
Qty: 20 CAPSULE | Refills: 0 | Status: SHIPPED | OUTPATIENT
Start: 2019-02-04 | End: 2019-04-01

## 2019-02-04 ASSESSMENT — PAIN SCALES - GENERAL: PAINLEVEL: EXTREME PAIN (8)

## 2019-02-04 NOTE — PROGRESS NOTES
"Nursing Notes:   Leticia Tinsley LPN  2/4/2019  2:41 PM  Signed  Chief Complaint   Patient presents with     Pharyngitis     for 5 days       Initial /66   Pulse 84   Temp 99  F (37.2  C) (Tympanic)   Resp 16   Wt 69.2 kg (152 lb 9.6 oz)   Breastfeeding? No   BMI 26.19 kg/m    Estimated body mass index is 26.19 kg/m  as calculated from the following:    Height as of 10/9/18: 1.626 m (5' 4\").    Weight as of this encounter: 69.2 kg (152 lb 9.6 oz).  Medication Reconciliation: complete    Leticia Tinsley LPN     She has had a sore throat for the past 5 days.  Leticia Tinsley LPN..................2/4/2019   2:40 PM      SUBJECTIVE:   Trinidad Hunter is a 33 year old female who presents to clinic today for the following health issues:    RESPIRATORY SYMPTOMS      Duration: 5 days    Description  sore throat, fatigue/malaise and myalgias    Severity: moderate    Accompanying signs and symptoms: No cough, no sob, wheezing, no runny nose.     History (predisposing factors):  none    Precipitating or alleviating factors: None    Therapies tried and outcome:  rest and fluids acetaminophen    Currently pregnant: This is not related to the pregnancy.     Problem list and histories reviewed & adjusted, as indicated.  Additional history: as documented    Past Medical History:   Diagnosis Date     Gastro-esophageal reflux disease without esophagitis     No Comments Provided       Current Outpatient Medications   Medication Sig Dispense Refill     amoxicillin (AMOXIL) 500 MG capsule Take 2 capsules (1,000 mg) by mouth daily for 10 days 20 capsule 0     ferrous sulfate (FE TABS) 325 (65 Fe) MG EC tablet Take 325 mg by mouth       LANsoprazole (PREVACID) 30 MG DR capsule Take 30 mg by mouth       Allergies   Allergen Reactions     Sulfa Drugs Rash         ROS:  Notable findings in the HPI.       OBJECTIVE:     /66   Pulse 84   Temp 99  F (37.2  C) (Tympanic)   Resp 16   Wt 69.2 kg (152 lb 9.6 oz)   " Breastfeeding? No   BMI 26.19 kg/m    Body mass index is 26.19 kg/m .  GENERAL: healthy, alert and no distress  EYES: Eyes grossly normal to inspection  HENT: normal cephalic/atraumatic, right ear: normal: no effusions, no erythema, normal landmarks, left ear: normal: no effusions, no erythema, normal landmarks, nose and mouth without ulcers or lesions, oropharynx clear, oral mucous membranes moist and tonsillar erythema  NECK: bilateral anterior cervical adenopathy  RESP: lungs clear to auscultation - no rales, rhonchi or wheezes  CV: regular rates and rhythm, normal S1 S2, no S3 or S4, no murmur, click or rub, peripheral pulses strong and no peripheral edema  SKIN: no suspicious lesions or rashes  PSYCH: mentation appears normal, affect normal/bright    Diagnostic Test Results:  Results for orders placed or performed in visit on 02/04/19 (from the past 24 hour(s))   Rapid strep screen   Result Value Ref Range    Specimen Description Throat     Rapid Strep A Screen (A)      POSITIVE: Group A Streptococcal antigen detected by immunoassay.       ASSESSMENT/PLAN:     1. Sore throat  - Rapid strep screen    2. Strep throat  - amoxicillin (AMOXIL) 500 MG capsule; Take 2 capsules (1,000 mg) by mouth daily for 10 days  Dispense: 20 capsule; Refill: 0      PLAN:    URI Adult:  Tylenol, Fluids, Rest, Saline gargles, Rx strep  Amoxicillin 1000 mg qd or 500 mg bid x 10 days and Vaporizer    Followup:    If not improving or if condition worsens, follow up with your Primary Care Provider    I explained my diagnostic considerations and recommendations to the patient, who voiced understanding and agreement with the treatment plan. All questions were answered. We discussed potential side effects of any prescribed or recommended therapies, as well as expectations for response to treatments. She was advised to contact our office if there is no improvement or worsening of conditions or symptoms.  If s/s worsen or persist, patient  will either come back or follow up with PCP.    Disclaimer:  This note consists of words and symbols derived from keyboarding, dictation, or using voice recognition software. As a result, there may be errors in the script that have gone undetected. Please consider this when interpreting information found in this note.      Heather Siu NP, 2/4/2019 3:08 PM

## 2019-02-04 NOTE — NURSING NOTE
"Chief Complaint   Patient presents with     Pharyngitis     for 5 days       Initial /66   Pulse 84   Temp 99  F (37.2  C) (Tympanic)   Resp 16   Wt 69.2 kg (152 lb 9.6 oz)   Breastfeeding? No   BMI 26.19 kg/m   Estimated body mass index is 26.19 kg/m  as calculated from the following:    Height as of 10/9/18: 1.626 m (5' 4\").    Weight as of this encounter: 69.2 kg (152 lb 9.6 oz).  Medication Reconciliation: complete    Leticia Tinsley LPN     She has had a sore throat for the past 5 days.  Leticia Tinsley LPN..................2/4/2019   2:40 PM    "

## 2019-02-04 NOTE — PATIENT INSTRUCTIONS
Patient Education     Pharyngitis: Strep (Confirmed)  You have had a positive test for strep throat. Strep throat is a contagious illness. It is spread by coughing, kissing or by touching others after touching your mouth or nose. Symptoms include throat pain that is worse with swallowing, aching all over, headache, and fever. It is treated with antibiotic medicine. This should help you start to feel better in 1 to 2 days.  Home care    Rest at home. Drink plenty of fluids to you won't get dehydrated.    No work or school for the first 2 days of taking the antibiotics. After this time, you will not be contagious. You can then return to school or work if you are feeling better.     Take antibiotic medicine for the full 10 days, even if you feel better. This is very important to ensure the infection is treated. It is also important to prevent medicine-resistant germs from developing. If you were given an antibiotic shot, you don't need any more antibiotics.    You may use acetaminophen or ibuprofen to control pain or fever, unless another medicine was prescribed for this. Talk with your healthcare provider before taking these medicines if you have chronic liver or kidney disease. Also talk with your healthcare provider if you have had a stomach ulcer or GI bleeding.    Throat lozenges or sprays help reduce pain. Gargling with warm saltwater will also reduce throat pain. Dissolve 1/2 teaspoon of salt in 1 glass of warm water. This may be useful just before meals.     Soft foods are OK. Don't eat salty or spicy foods.  Follow-up care  Follow up with your healthcare provider or our staff if you don't get better over the next week.  When to seek medical advice  Call your healthcare provider right away if any of these occur:    Fever of 100.4 F (38 C) or higher, or as directed by your healthcare provider    New or worsening ear pain, sinus pain, or headache    Painful lumps in the back of neck    Stiff neck    Lymph  nodes getting larger or becoming soft in the middle    You can't swallow liquids or you can't open your mouth wide because of throat pain    Signs of dehydration. These include very dark urine or no urine, sunken eyes, and dizziness.    Trouble breathing or noisy breathing    Muffled voice    Rash  Prevention  Here are steps you can take to help prevent an infection:    Keep good hand washing habits.    Don t have close contact with people who have sore throats, colds, or other upper respiratory infections.    Don t smoke, and stay away from secondhand smoke.

## 2019-02-26 ENCOUNTER — PRENATAL OFFICE VISIT (OUTPATIENT)
Dept: OBGYN | Facility: OTHER | Age: 34
End: 2019-02-26
Attending: OBSTETRICS & GYNECOLOGY
Payer: COMMERCIAL

## 2019-02-26 VITALS
TEMPERATURE: 98.6 F | HEART RATE: 80 BPM | DIASTOLIC BLOOD PRESSURE: 80 MMHG | BODY MASS INDEX: 25.18 KG/M2 | SYSTOLIC BLOOD PRESSURE: 110 MMHG | WEIGHT: 147.5 LBS | HEIGHT: 64 IN | RESPIRATION RATE: 16 BRPM

## 2019-02-26 DIAGNOSIS — Z34.90 NORMAL PREGNANCY, ANTEPARTUM: Primary | ICD-10-CM

## 2019-02-26 PROCEDURE — 90471 IMMUNIZATION ADMIN: CPT | Performed by: OBSTETRICS & GYNECOLOGY

## 2019-02-26 PROCEDURE — 90715 TDAP VACCINE 7 YRS/> IM: CPT | Performed by: OBSTETRICS & GYNECOLOGY

## 2019-02-26 PROCEDURE — 99207 ZZC OB VISIT-NO CHARGE - GICH ONLY: CPT | Performed by: OBSTETRICS & GYNECOLOGY

## 2019-02-26 ASSESSMENT — ANXIETY QUESTIONNAIRES
2. NOT BEING ABLE TO STOP OR CONTROL WORRYING: NOT AT ALL
IF YOU CHECKED OFF ANY PROBLEMS ON THIS QUESTIONNAIRE, HOW DIFFICULT HAVE THESE PROBLEMS MADE IT FOR YOU TO DO YOUR WORK, TAKE CARE OF THINGS AT HOME, OR GET ALONG WITH OTHER PEOPLE: NOT DIFFICULT AT ALL
4. TROUBLE RELAXING: NOT AT ALL
3. WORRYING TOO MUCH ABOUT DIFFERENT THINGS: NOT AT ALL
GAD7 TOTAL SCORE: 0
5. BEING SO RESTLESS THAT IT IS HARD TO SIT STILL: NOT AT ALL
6. BECOMING EASILY ANNOYED OR IRRITABLE: NOT AT ALL
1. FEELING NERVOUS, ANXIOUS, OR ON EDGE: NOT AT ALL
7. FEELING AFRAID AS IF SOMETHING AWFUL MIGHT HAPPEN: NOT AT ALL

## 2019-02-26 ASSESSMENT — MIFFLIN-ST. JEOR: SCORE: 1364.06

## 2019-02-26 ASSESSMENT — PAIN SCALES - GENERAL: PAINLEVEL: NO PAIN (0)

## 2019-02-26 ASSESSMENT — PATIENT HEALTH QUESTIONNAIRE - PHQ9: SUM OF ALL RESPONSES TO PHQ QUESTIONS 1-9: 0

## 2019-02-26 NOTE — NURSING NOTE
Patient presents to the clinic for an OB visit. She is 28w4d.  Linwood Mkcay LPN ..............2/26/2019 8:27 AM    Medication Reconciliation Completed.    Linwood Mckay LPN  2/26/2019 8:27 AM    Prior to injection, verified patient identity using patient's name and date of birth.  Due to injection administration, patient instructed to remain in clinic for 15 minutes  afterwards, and to report any adverse reaction to me immediately.    Boostrix    Drug Amount Wasted:  None.  Vial/Syringe: Single dose vial  Expiration Date:  05/03/2020    Linwood Mckay LPN ..............2/26/2019 9:06 AM

## 2019-02-26 NOTE — PROGRESS NOTES
"  CC: Recheck OB visit at 28w5d    HPI: Trinidad Hunter presents for a routine OB visit now at 28w5d  She has no concerns. Denies cramping, bleeding, normal fetal movement.    Obstetric History       T2      L2     SAB0   TAB0   Ectopic0   Multiple0   Live Births2       # Outcome Date GA Lbr Steven/2nd Weight Sex Delivery Anes PTL Lv   3 Current            2 Term 10/24/17   2.693 kg (5 lb 15 oz) M IVD EPI N LADI      Name: Osvaldo Hunter    1 Term 14   2.92 kg (6 lb 7 oz) F  EPI N LADI      Name: Alayna Hunter        Current Outpatient Medications   Medication     ferrous sulfate (FE TABS) 325 (65 Fe) MG EC tablet     LANsoprazole (PREVACID) 30 MG DR capsule     No current facility-administered medications for this visit.          O: /80 (BP Location: Right arm, Patient Position: Sitting, Cuff Size: Adult Large)   Pulse 80   Temp 98.6  F (37  C)   Resp 16   Ht 1.626 m (5' 4\")   Wt 66.9 kg (147 lb 8 oz)   Breastfeeding? No   BMI 25.32 kg/m    Body mass index is 25.32 kg/m .  See OB flow sheet  EXAM:  NAD  FH: 26  FHT: 150    A/P: 28w5d gestation    Recheck in 4 weeks    Pregnancy risk factors include:  History of rapid delivery (fast second stage)   ( x 2)    Rafael Perez MD FACOG  8:52 AM 2019   "

## 2019-02-27 ASSESSMENT — ANXIETY QUESTIONNAIRES: GAD7 TOTAL SCORE: 0

## 2019-04-01 ENCOUNTER — PRENATAL OFFICE VISIT (OUTPATIENT)
Dept: OBGYN | Facility: OTHER | Age: 34
End: 2019-04-01
Attending: OBSTETRICS & GYNECOLOGY
Payer: COMMERCIAL

## 2019-04-01 VITALS
TEMPERATURE: 98.5 F | HEART RATE: 72 BPM | SYSTOLIC BLOOD PRESSURE: 114 MMHG | WEIGHT: 154 LBS | RESPIRATION RATE: 20 BRPM | DIASTOLIC BLOOD PRESSURE: 78 MMHG | BODY MASS INDEX: 26.43 KG/M2

## 2019-04-01 DIAGNOSIS — Z34.90 NORMAL PREGNANCY, ANTEPARTUM: Primary | ICD-10-CM

## 2019-04-01 PROCEDURE — 99207 ZZC OB VISIT-NO CHARGE - GICH ONLY: CPT | Performed by: OBSTETRICS & GYNECOLOGY

## 2019-04-01 ASSESSMENT — PAIN SCALES - GENERAL: PAINLEVEL: NO PAIN (0)

## 2019-04-01 NOTE — NURSING NOTE
Patient is here for ob check 33w4d.1 Babystep coupon given.    Betzaida Pablo LPN .............4/1/2019     3:47 PM      No LMP recorded. Patient is pregnant.  Medication Reconciliation: complete    Betzaida Pablo LPN  4/1/2019 3:51 PM

## 2019-04-01 NOTE — PROGRESS NOTES
CC: Recheck OB visit at 33w4d    HPI: Trinidad Hunter presents for a routine OB visit now at 33w4d  She has no concerns. Denies cramping, bleeding, normal fetal movement.    Obstetric History       T2      L2     SAB0   TAB0   Ectopic0   Multiple0   Live Births2       # Outcome Date GA Lbr Steven/2nd Weight Sex Delivery Anes PTL Lv   3 Current            2 Term 10/24/17   2.693 kg (5 lb 15 oz) M IVD EPI N LADI      Name: Osvaldo Hunter    1 Term 14   2.92 kg (6 lb 7 oz) F  EPI N LADI      Name: Alayna Hunter        Current Outpatient Medications   Medication     ferrous sulfate (FE TABS) 325 (65 Fe) MG EC tablet     LANsoprazole (PREVACID) 30 MG DR capsule     No current facility-administered medications for this visit.          O: /78 (BP Location: Right arm, Patient Position: Sitting, Cuff Size: Adult Regular)   Pulse 72   Temp 98.5  F (36.9  C) (Tympanic)   Resp 20   Wt 69.9 kg (154 lb)   Breastfeeding? No   BMI 26.43 kg/m    Body mass index is 26.43 kg/m .  See OB flow sheet  EXAM:  NAD  Fh: 31.5  FHT: 145    Results for orders placed or performed in visit on 19   Rapid strep screen   Result Value Ref Range    Specimen Description Throat     Rapid Strep A Screen (A)      POSITIVE: Group A Streptococcal antigen detected by immunoassay.       A/P: 33w4d gestation      Recheck in 2 weeks    Pregnancy risk factors include:  History of rapid delivery (fast second stage)   ( x 2)    Rafael Perez MD FACOG  3:57 PM 2019

## 2019-04-15 ENCOUNTER — PRENATAL OFFICE VISIT (OUTPATIENT)
Dept: OBGYN | Facility: OTHER | Age: 34
End: 2019-04-15
Attending: OBSTETRICS & GYNECOLOGY
Payer: COMMERCIAL

## 2019-04-15 VITALS
DIASTOLIC BLOOD PRESSURE: 74 MMHG | HEART RATE: 82 BPM | WEIGHT: 156.5 LBS | TEMPERATURE: 98 F | SYSTOLIC BLOOD PRESSURE: 108 MMHG | RESPIRATION RATE: 16 BRPM | BODY MASS INDEX: 26.86 KG/M2

## 2019-04-15 DIAGNOSIS — Z34.90 NORMAL PREGNANCY, ANTEPARTUM: ICD-10-CM

## 2019-04-15 DIAGNOSIS — Z34.80 NORMAL PREGNANCY IN MULTIGRAVIDA: Primary | ICD-10-CM

## 2019-04-15 PROCEDURE — 87081 CULTURE SCREEN ONLY: CPT | Performed by: OBSTETRICS & GYNECOLOGY

## 2019-04-15 PROCEDURE — 99207 ZZC OB VISIT-NO CHARGE - GICH ONLY: CPT | Performed by: OBSTETRICS & GYNECOLOGY

## 2019-04-15 RX ORDER — PNV NO.95/FERROUS FUM/FOLIC AC 28MG-0.8MG
1 TABLET ORAL
COMMUNITY
Start: 2019-04-15 | End: 2022-05-02

## 2019-04-15 ASSESSMENT — PAIN SCALES - GENERAL: PAINLEVEL: NO PAIN (0)

## 2019-04-15 NOTE — PROGRESS NOTES
CC: Recheck OB visit at 35w4d    HPI: Trinidad Hunter presents for a routine OB visit now at 35w4d  She has no concerns. Denies cramping, bleeding, normal fetal movement.     OB History    Para Term  AB Living   3 2 2 0 0 2   SAB TAB Ectopic Multiple Live Births   0 0 0 0 2      # Outcome Date GA Lbr Steven/2nd Weight Sex Delivery Anes PTL Lv   3 Current            2 Term 10/24/17   2.693 kg (5 lb 15 oz) M IVD EPI N LADI      Name: Osvaldo Hunter    1 Term 14   2.92 kg (6 lb 7 oz) F  EPI N LADI      Name: Alayna Hunter     Current Outpatient Medications   Medication     ferrous sulfate (FE TABS) 325 (65 Fe) MG EC tablet     Prenatal Vit-Fe Fumarate-FA (PRENATAL VITAMIN) 27-0.8 MG TABS     No current facility-administered medications for this visit.          O: /74 (BP Location: Right arm)   Pulse 82   Temp 98  F (36.7  C)   Resp 16   Wt 71 kg (156 lb 8 oz)   BMI 26.86 kg/m    Body mass index is 26.86 kg/m .  See OB flow sheet  EXAM:  NAD  FH : 35 cm  FHT: 140    A/P: 35w4d gestation    Recheck in 1 weeks      Problem List: Pregnancy risk factors include:  History of rapid delivery (fast second stage)   ( x 2)      Rafael Perez MD FACOG  3:47 PM 4/15/2019

## 2019-04-17 LAB
BACTERIA SPEC CULT: NORMAL
SPECIMEN SOURCE: NORMAL

## 2019-04-29 ENCOUNTER — PRENATAL OFFICE VISIT (OUTPATIENT)
Dept: OBGYN | Facility: OTHER | Age: 34
End: 2019-04-29
Attending: OBSTETRICS & GYNECOLOGY
Payer: COMMERCIAL

## 2019-04-29 VITALS
BODY MASS INDEX: 27.52 KG/M2 | DIASTOLIC BLOOD PRESSURE: 82 MMHG | SYSTOLIC BLOOD PRESSURE: 122 MMHG | WEIGHT: 160.3 LBS | HEART RATE: 86 BPM

## 2019-04-29 DIAGNOSIS — Z34.90 NORMAL PREGNANCY, ANTEPARTUM: Primary | ICD-10-CM

## 2019-04-29 PROCEDURE — 99207 ZZC OB VISIT-NO CHARGE - GICH ONLY: CPT | Performed by: OBSTETRICS & GYNECOLOGY

## 2019-04-29 ASSESSMENT — PAIN SCALES - GENERAL: PAINLEVEL: NO PAIN (0)

## 2019-04-29 NOTE — PROGRESS NOTES
CC: Recheck OB visit at 37w4d    HPI: Trinidad Hunter presents for a routine OB visit now at 37w4d  She has no concerns. Denies cramping, bleeding, normal fetal movement. Some fatigue and BH contractions.    OB History    Para Term  AB Living   3 2 2 0 0 2   SAB TAB Ectopic Multiple Live Births   0 0 0 0 2      # Outcome Date GA Lbr Steven/2nd Weight Sex Delivery Anes PTL Lv   3 Current            2 Term 10/24/17   2.693 kg (5 lb 15 oz) M IVD EPI N LADI      Name: Osvaldo Hunter    1 Term 14   2.92 kg (6 lb 7 oz) F  EPI N LADI      Name: Alayna Hunter     Current Outpatient Medications   Medication     ferrous sulfate (FE TABS) 325 (65 Fe) MG EC tablet     Prenatal Vit-Fe Fumarate-FA (PRENATAL VITAMIN) 27-0.8 MG TABS     No current facility-administered medications for this visit.          O: /82 (BP Location: Right arm)   Pulse 86   Wt 72.7 kg (160 lb 4.8 oz)   BMI 27.52 kg/m    Body mass index is 27.52 kg/m .  See OB flow sheet  EXAM:  NAD  FHT's 140  FH: 37  cx 2/50/-1    Results for orders placed or performed in visit on 04/15/19   Rectal/Vag Group B Strep Culture   Result Value Ref Range    Specimen Description Vaginal Rectal     Culture Micro No Group B Streptococcus isolated        A/P: 37w4d gestation    Recheck in 1 week      Problem List: Pregnancy risk factors include:  History of rapid delivery (fast second stage)   ( x 2)      Rafael Perez MD FACOG  3:59 PM 2019

## 2019-05-06 ENCOUNTER — PRENATAL OFFICE VISIT (OUTPATIENT)
Dept: OBGYN | Facility: OTHER | Age: 34
End: 2019-05-06
Attending: OBSTETRICS & GYNECOLOGY
Payer: COMMERCIAL

## 2019-05-06 VITALS
SYSTOLIC BLOOD PRESSURE: 116 MMHG | BODY MASS INDEX: 27.45 KG/M2 | DIASTOLIC BLOOD PRESSURE: 82 MMHG | WEIGHT: 159.9 LBS | HEART RATE: 76 BPM

## 2019-05-06 DIAGNOSIS — Z34.90 NORMAL PREGNANCY, ANTEPARTUM: Primary | ICD-10-CM

## 2019-05-06 PROCEDURE — 99207 ZZC OB VISIT-NO CHARGE - GICH ONLY: CPT | Performed by: OBSTETRICS & GYNECOLOGY

## 2019-05-06 ASSESSMENT — PAIN SCALES - GENERAL: PAINLEVEL: NO PAIN (0)

## 2019-05-06 NOTE — PROGRESS NOTES
CC: Recheck OB visit at 38w4d    HPI: Trinidad Hunter presents for a routine OB visit now at 38w4d  She has no concerns. Denies cramping, bleeding, normal fetal movement.    OB History    Para Term  AB Living   3 2 2 0 0 2   SAB TAB Ectopic Multiple Live Births   0 0 0 0 2      # Outcome Date GA Lbr Steven/2nd Weight Sex Delivery Anes PTL Lv   3 Current            2 Term 10/24/17   2.693 kg (5 lb 15 oz) M IVD EPI N LADI      Name: Osvaldo Hunter    1 Term 14   2.92 kg (6 lb 7 oz) F  EPI N LADI      Name: Alayna Hunter     Current Outpatient Medications   Medication     ferrous sulfate (FE TABS) 325 (65 Fe) MG EC tablet     Prenatal Vit-Fe Fumarate-FA (PRENATAL VITAMIN) 27-0.8 MG TABS     No current facility-administered medications for this visit.          O: /82 (BP Location: Right arm, Patient Position: Sitting, Cuff Size: Adult Regular)   Pulse 76   Wt 72.5 kg (159 lb 14.4 oz)   BMI 27.45 kg/m    Body mass index is 27.45 kg/m .  See OB flow sheet  EXAM:  NAD  's  cx 3/50/-1    Results for orders placed or performed in visit on 04/15/19   Rectal/Vag Group B Strep Culture   Result Value Ref Range    Specimen Description Vaginal Rectal     Culture Micro No Group B Streptococcus isolated        A/P: 38w4d gestation    Recheck in 1 week  Consider induction at 39 weeks, history of rapid labor.        Problem List: Pregnancy risk factors include:  History of rapid delivery (fast second stage)   ( x 2)  Patient checking her calendar for  or  of possible.    Rafael Perez MD FACOG  3:50 PM 2019

## 2019-05-07 ENCOUNTER — TELEPHONE (OUTPATIENT)
Dept: OBGYN | Facility: OTHER | Age: 34
End: 2019-05-07

## 2019-05-07 RX ORDER — NALOXONE HYDROCHLORIDE 0.4 MG/ML
.1-.4 INJECTION, SOLUTION INTRAMUSCULAR; INTRAVENOUS; SUBCUTANEOUS
Status: CANCELLED | OUTPATIENT
Start: 2019-05-07

## 2019-05-07 RX ORDER — OXYTOCIN 10 [USP'U]/ML
10 INJECTION, SOLUTION INTRAMUSCULAR; INTRAVENOUS
Status: CANCELLED | OUTPATIENT
Start: 2019-05-07

## 2019-05-07 RX ORDER — ACETAMINOPHEN 325 MG/1
650 TABLET ORAL EVERY 4 HOURS PRN
Status: CANCELLED | OUTPATIENT
Start: 2019-05-07

## 2019-05-07 RX ORDER — FENTANYL CITRATE 50 UG/ML
50-100 INJECTION, SOLUTION INTRAMUSCULAR; INTRAVENOUS
Status: CANCELLED | OUTPATIENT
Start: 2019-05-07

## 2019-05-07 RX ORDER — ONDANSETRON 2 MG/ML
4 INJECTION INTRAMUSCULAR; INTRAVENOUS EVERY 6 HOURS PRN
Status: CANCELLED | OUTPATIENT
Start: 2019-05-07

## 2019-05-07 RX ORDER — SODIUM CHLORIDE, SODIUM LACTATE, POTASSIUM CHLORIDE, CALCIUM CHLORIDE 600; 310; 30; 20 MG/100ML; MG/100ML; MG/100ML; MG/100ML
INJECTION, SOLUTION INTRAVENOUS CONTINUOUS
Status: CANCELLED | OUTPATIENT
Start: 2019-05-07

## 2019-05-07 RX ORDER — OXYCODONE AND ACETAMINOPHEN 5; 325 MG/1; MG/1
1 TABLET ORAL
Status: CANCELLED | OUTPATIENT
Start: 2019-05-07

## 2019-05-07 RX ORDER — METHYLERGONOVINE MALEATE 0.2 MG/ML
200 INJECTION INTRAVENOUS
Status: CANCELLED | OUTPATIENT
Start: 2019-05-07

## 2019-05-07 RX ORDER — IBUPROFEN 200 MG
800 TABLET ORAL
Status: CANCELLED | OUTPATIENT
Start: 2019-05-07

## 2019-05-07 RX ORDER — CARBOPROST TROMETHAMINE 250 UG/ML
250 INJECTION, SOLUTION INTRAMUSCULAR
Status: CANCELLED | OUTPATIENT
Start: 2019-05-07

## 2019-05-07 RX ORDER — LIDOCAINE 40 MG/G
CREAM TOPICAL
Status: CANCELLED | OUTPATIENT
Start: 2019-05-07

## 2019-05-07 NOTE — TELEPHONE ENCOUNTER
Trinidad left a message that she talked to Dr. Perez yesterday and she is now ready to have her induction scheduled.  Please give her a call today to set that up.  Violette Ware on 5/7/2019 at 8:23 AM

## 2019-05-07 NOTE — TELEPHONE ENCOUNTER
Returned patient's call.  Patient scheduled for induction 5/9/19 at 0730 with [facility].  Approved by L&D and form faxed.   Cindi Villanueva RN on 5/7/2019 at 8:53 AM

## 2019-05-09 ENCOUNTER — ANESTHESIA EVENT (OUTPATIENT)
Dept: OBGYN | Facility: OTHER | Age: 34
End: 2019-05-09
Payer: COMMERCIAL

## 2019-05-09 ENCOUNTER — ANESTHESIA (OUTPATIENT)
Dept: OBGYN | Facility: OTHER | Age: 34
End: 2019-05-09
Payer: COMMERCIAL

## 2019-05-09 ENCOUNTER — HOSPITAL ENCOUNTER (INPATIENT)
Facility: OTHER | Age: 34
LOS: 2 days | Discharge: HOME | End: 2019-05-11
Attending: OBSTETRICS & GYNECOLOGY | Admitting: OBSTETRICS & GYNECOLOGY
Payer: COMMERCIAL

## 2019-05-09 PROBLEM — Z37.9 NORMAL LABOR: Status: ACTIVE | Noted: 2019-05-09

## 2019-05-09 LAB
ABO + RH BLD: NORMAL
ABO + RH BLD: NORMAL
BASOPHILS # BLD AUTO: 0.1 10E9/L (ref 0–0.2)
BASOPHILS NFR BLD AUTO: 0.7 %
BLD GP AB SCN SERPL QL: NORMAL
BLOOD BANK CMNT PATIENT-IMP: NORMAL
DIFFERENTIAL METHOD BLD: ABNORMAL
EOSINOPHIL # BLD AUTO: 0.2 10E9/L (ref 0–0.7)
EOSINOPHIL NFR BLD AUTO: 2 %
ERYTHROCYTE [DISTWIDTH] IN BLOOD BY AUTOMATED COUNT: 14.6 % (ref 10–15)
HCT VFR BLD AUTO: 37.8 % (ref 35–47)
HGB BLD-MCNC: 12.3 G/DL (ref 11.7–15.7)
IMM GRANULOCYTES # BLD: 0.1 10E9/L (ref 0–0.4)
IMM GRANULOCYTES NFR BLD: 1 %
LYMPHOCYTES # BLD AUTO: 2.7 10E9/L (ref 0.8–5.3)
LYMPHOCYTES NFR BLD AUTO: 22.5 %
MCH RBC QN AUTO: 27.4 PG (ref 26.5–33)
MCHC RBC AUTO-ENTMCNC: 32.5 G/DL (ref 31.5–36.5)
MCV RBC AUTO: 84 FL (ref 78–100)
MONOCYTES # BLD AUTO: 0.7 10E9/L (ref 0–1.3)
MONOCYTES NFR BLD AUTO: 5.6 %
NEUTROPHILS # BLD AUTO: 8.2 10E9/L (ref 1.6–8.3)
NEUTROPHILS NFR BLD AUTO: 68.2 %
PLATELET # BLD AUTO: 177 10E9/L (ref 150–450)
RBC # BLD AUTO: 4.49 10E12/L (ref 3.8–5.2)
SPECIMEN EXP DATE BLD: NORMAL
WBC # BLD AUTO: 12 10E9/L (ref 4–11)

## 2019-05-09 PROCEDURE — 36415 COLL VENOUS BLD VENIPUNCTURE: CPT | Performed by: OBSTETRICS & GYNECOLOGY

## 2019-05-09 PROCEDURE — 86780 TREPONEMA PALLIDUM: CPT | Performed by: OBSTETRICS & GYNECOLOGY

## 2019-05-09 PROCEDURE — 59400 OBSTETRICAL CARE: CPT | Performed by: OBSTETRICS & GYNECOLOGY

## 2019-05-09 PROCEDURE — 25000125 ZZHC RX 250: Performed by: OBSTETRICS & GYNECOLOGY

## 2019-05-09 PROCEDURE — 86901 BLOOD TYPING SEROLOGIC RH(D): CPT | Performed by: OBSTETRICS & GYNECOLOGY

## 2019-05-09 PROCEDURE — 25000128 H RX IP 250 OP 636: Performed by: NURSE ANESTHETIST, CERTIFIED REGISTERED

## 2019-05-09 PROCEDURE — 0W9N3ZZ DRAINAGE OF FEMALE PERINEUM, PERCUTANEOUS APPROACH: ICD-10-PCS | Performed by: OBSTETRICS & GYNECOLOGY

## 2019-05-09 PROCEDURE — 12000000 ZZH R&B MED SURG/OB

## 2019-05-09 PROCEDURE — 25000128 H RX IP 250 OP 636: Performed by: OBSTETRICS & GYNECOLOGY

## 2019-05-09 PROCEDURE — 10907ZC DRAINAGE OF AMNIOTIC FLUID, THERAPEUTIC FROM PRODUCTS OF CONCEPTION, VIA NATURAL OR ARTIFICIAL OPENING: ICD-10-PCS | Performed by: OBSTETRICS & GYNECOLOGY

## 2019-05-09 PROCEDURE — 25800030 ZZH RX IP 258 OP 636: Performed by: OBSTETRICS & GYNECOLOGY

## 2019-05-09 PROCEDURE — 25800030 ZZH RX IP 258 OP 636: Performed by: NURSE ANESTHETIST, CERTIFIED REGISTERED

## 2019-05-09 PROCEDURE — 25000132 ZZH RX MED GY IP 250 OP 250 PS 637: Performed by: OBSTETRICS & GYNECOLOGY

## 2019-05-09 PROCEDURE — 85025 COMPLETE CBC W/AUTO DIFF WBC: CPT | Performed by: OBSTETRICS & GYNECOLOGY

## 2019-05-09 PROCEDURE — 86850 RBC ANTIBODY SCREEN: CPT | Performed by: OBSTETRICS & GYNECOLOGY

## 2019-05-09 PROCEDURE — 37000011 ZZH ANESTHESIA WARD SERVICE

## 2019-05-09 PROCEDURE — 86900 BLOOD TYPING SEROLOGIC ABO: CPT | Performed by: OBSTETRICS & GYNECOLOGY

## 2019-05-09 PROCEDURE — 72200001 ZZH LABOR CARE VAGINAL DELIVERY SINGLE

## 2019-05-09 PROCEDURE — 25000125 ZZHC RX 250: Performed by: NURSE ANESTHETIST, CERTIFIED REGISTERED

## 2019-05-09 RX ORDER — OXYCODONE AND ACETAMINOPHEN 5; 325 MG/1; MG/1
1 TABLET ORAL
Status: DISCONTINUED | OUTPATIENT
Start: 2019-05-09 | End: 2019-05-09

## 2019-05-09 RX ORDER — FENTANYL CITRATE 50 UG/ML
50-100 INJECTION, SOLUTION INTRAMUSCULAR; INTRAVENOUS
Status: DISCONTINUED | OUTPATIENT
Start: 2019-05-09 | End: 2019-05-09

## 2019-05-09 RX ORDER — ONDANSETRON 2 MG/ML
4 INJECTION INTRAMUSCULAR; INTRAVENOUS EVERY 6 HOURS PRN
Status: DISCONTINUED | OUTPATIENT
Start: 2019-05-09 | End: 2019-05-09

## 2019-05-09 RX ORDER — ROPIVACAINE HYDROCHLORIDE 2 MG/ML
INJECTION, SOLUTION EPIDURAL; INFILTRATION; PERINEURAL PRN
Status: DISCONTINUED | OUTPATIENT
Start: 2019-05-09 | End: 2019-05-09

## 2019-05-09 RX ORDER — OXYTOCIN 10 [USP'U]/ML
10 INJECTION, SOLUTION INTRAMUSCULAR; INTRAVENOUS
Status: DISCONTINUED | OUTPATIENT
Start: 2019-05-09 | End: 2019-05-11 | Stop reason: HOSPADM

## 2019-05-09 RX ORDER — FENTANYL/BUPIVACAINE/NS/PF 2-1250MCG
PLASTIC BAG, INJECTION (ML) INJECTION CONTINUOUS PRN
Status: DISCONTINUED | OUTPATIENT
Start: 2019-05-09 | End: 2019-05-09

## 2019-05-09 RX ORDER — METHYLERGONOVINE MALEATE 0.2 MG/ML
200 INJECTION INTRAVENOUS
Status: DISCONTINUED | OUTPATIENT
Start: 2019-05-09 | End: 2019-05-09

## 2019-05-09 RX ORDER — BISACODYL 10 MG
10 SUPPOSITORY, RECTAL RECTAL DAILY PRN
Status: DISCONTINUED | OUTPATIENT
Start: 2019-05-11 | End: 2019-05-11 | Stop reason: HOSPADM

## 2019-05-09 RX ORDER — NALOXONE HYDROCHLORIDE 0.4 MG/ML
.1-.4 INJECTION, SOLUTION INTRAMUSCULAR; INTRAVENOUS; SUBCUTANEOUS
Status: DISCONTINUED | OUTPATIENT
Start: 2019-05-09 | End: 2019-05-09

## 2019-05-09 RX ORDER — CARBOPROST TROMETHAMINE 250 UG/ML
250 INJECTION, SOLUTION INTRAMUSCULAR
Status: DISCONTINUED | OUTPATIENT
Start: 2019-05-09 | End: 2019-05-09

## 2019-05-09 RX ORDER — IBUPROFEN 400 MG/1
800 TABLET, FILM COATED ORAL
Status: DISCONTINUED | OUTPATIENT
Start: 2019-05-09 | End: 2019-05-09

## 2019-05-09 RX ORDER — ACETAMINOPHEN 325 MG/1
650 TABLET ORAL EVERY 4 HOURS PRN
Status: DISCONTINUED | OUTPATIENT
Start: 2019-05-09 | End: 2019-05-09

## 2019-05-09 RX ORDER — ACETAMINOPHEN 325 MG/1
650 TABLET ORAL EVERY 4 HOURS PRN
Status: DISCONTINUED | OUTPATIENT
Start: 2019-05-09 | End: 2019-05-11 | Stop reason: HOSPADM

## 2019-05-09 RX ORDER — LANOLIN 100 %
OINTMENT (GRAM) TOPICAL
Status: DISCONTINUED | OUTPATIENT
Start: 2019-05-09 | End: 2019-05-11 | Stop reason: HOSPADM

## 2019-05-09 RX ORDER — HYDROCORTISONE 2.5 %
CREAM (GRAM) TOPICAL 3 TIMES DAILY PRN
Status: DISCONTINUED | OUTPATIENT
Start: 2019-05-09 | End: 2019-05-11 | Stop reason: HOSPADM

## 2019-05-09 RX ORDER — SODIUM CHLORIDE, SODIUM LACTATE, POTASSIUM CHLORIDE, CALCIUM CHLORIDE 600; 310; 30; 20 MG/100ML; MG/100ML; MG/100ML; MG/100ML
INJECTION, SOLUTION INTRAVENOUS CONTINUOUS
Status: DISCONTINUED | OUTPATIENT
Start: 2019-05-09 | End: 2019-05-09

## 2019-05-09 RX ORDER — LIDOCAINE 40 MG/G
CREAM TOPICAL
Status: DISCONTINUED | OUTPATIENT
Start: 2019-05-09 | End: 2019-05-09

## 2019-05-09 RX ORDER — NALBUPHINE HYDROCHLORIDE 10 MG/ML
2.5-5 INJECTION, SOLUTION INTRAMUSCULAR; INTRAVENOUS; SUBCUTANEOUS EVERY 6 HOURS PRN
Status: DISCONTINUED | OUTPATIENT
Start: 2019-05-09 | End: 2019-05-09

## 2019-05-09 RX ORDER — IBUPROFEN 400 MG/1
800 TABLET, FILM COATED ORAL EVERY 6 HOURS PRN
Status: DISCONTINUED | OUTPATIENT
Start: 2019-05-09 | End: 2019-05-11 | Stop reason: HOSPADM

## 2019-05-09 RX ORDER — OXYTOCIN 10 [USP'U]/ML
10 INJECTION, SOLUTION INTRAMUSCULAR; INTRAVENOUS
Status: DISCONTINUED | OUTPATIENT
Start: 2019-05-09 | End: 2019-05-09

## 2019-05-09 RX ORDER — DOCUSATE SODIUM 100 MG/1
100 CAPSULE, LIQUID FILLED ORAL 2 TIMES DAILY
Status: DISCONTINUED | OUTPATIENT
Start: 2019-05-09 | End: 2019-05-11 | Stop reason: HOSPADM

## 2019-05-09 RX ORDER — NALOXONE HYDROCHLORIDE 0.4 MG/ML
.1-.4 INJECTION, SOLUTION INTRAMUSCULAR; INTRAVENOUS; SUBCUTANEOUS
Status: DISCONTINUED | OUTPATIENT
Start: 2019-05-09 | End: 2019-05-11 | Stop reason: HOSPADM

## 2019-05-09 RX ORDER — PHENYLEPHRINE HCL IN 0.9% NACL 1 MG/10 ML
100 SYRINGE (ML) INTRAVENOUS EVERY 5 MIN PRN
Status: COMPLETED | OUTPATIENT
Start: 2019-05-09 | End: 2019-05-09

## 2019-05-09 RX ORDER — LIDOCAINE HYDROCHLORIDE AND EPINEPHRINE 15; 5 MG/ML; UG/ML
INJECTION, SOLUTION EPIDURAL PRN
Status: DISCONTINUED | OUTPATIENT
Start: 2019-05-09 | End: 2019-05-09

## 2019-05-09 RX ADMIN — LIDOCAINE HYDROCHLORIDE 1.5 ML: 10 INJECTION, SOLUTION EPIDURAL; INFILTRATION; INTRACAUDAL; PERINEURAL at 10:27

## 2019-05-09 RX ADMIN — Medication 100 MCG: at 11:03

## 2019-05-09 RX ADMIN — ROPIVACAINE HYDROCHLORIDE 6 ML: 2 INJECTION, SOLUTION EPIDURAL; INFILTRATION; PERINEURAL at 10:50

## 2019-05-09 RX ADMIN — Medication 10 ML/HR: at 10:56

## 2019-05-09 RX ADMIN — ACETAMINOPHEN 650 MG: 325 TABLET, FILM COATED ORAL at 23:34

## 2019-05-09 RX ADMIN — DOCUSATE SODIUM 100 MG: 100 CAPSULE, LIQUID FILLED ORAL at 23:34

## 2019-05-09 RX ADMIN — ROPIVACAINE HYDROCHLORIDE 10 ML/HR: 2 INJECTION, SOLUTION EPIDURAL; INFILTRATION; PERINEURAL at 10:19

## 2019-05-09 RX ADMIN — LIDOCAINE HYDROCHLORIDE AND EPINEPHRINE 5 ML: 15; 5 INJECTION, SOLUTION EPIDURAL at 10:43

## 2019-05-09 RX ADMIN — SODIUM CHLORIDE, POTASSIUM CHLORIDE, SODIUM LACTATE AND CALCIUM CHLORIDE: 600; 310; 30; 20 INJECTION, SOLUTION INTRAVENOUS at 10:17

## 2019-05-09 RX ADMIN — Medication 100 MCG: at 12:32

## 2019-05-09 RX ADMIN — IBUPROFEN 800 MG: 400 TABLET, FILM COATED ORAL at 20:50

## 2019-05-09 RX ADMIN — Medication 100 MCG: at 11:17

## 2019-05-09 RX ADMIN — SODIUM CHLORIDE, POTASSIUM CHLORIDE, SODIUM LACTATE AND CALCIUM CHLORIDE: 600; 310; 30; 20 INJECTION, SOLUTION INTRAVENOUS at 13:16

## 2019-05-09 RX ADMIN — LIDOCAINE HYDROCHLORIDE 1.5 ML: 10 INJECTION, SOLUTION EPIDURAL; INFILTRATION; INTRACAUDAL; PERINEURAL at 10:39

## 2019-05-09 RX ADMIN — Medication 100 MCG: at 13:42

## 2019-05-09 RX ADMIN — OXYTOCIN 2 MILLI-UNITS/MIN: 10 INJECTION, SOLUTION INTRAMUSCULAR; INTRAVENOUS at 09:05

## 2019-05-09 RX ADMIN — SODIUM CHLORIDE, POTASSIUM CHLORIDE, SODIUM LACTATE AND CALCIUM CHLORIDE 1000 ML: 600; 310; 30; 20 INJECTION, SOLUTION INTRAVENOUS at 09:05

## 2019-05-09 NOTE — PROGRESS NOTES
Patient to [facility] with  for induction of labor and birth of baby.  To Room 404.  Oriented to room.  Plan of care discussed with patient. EUM/EFM applied and admit data completed.  Category 1 tracing noted with patient having contractions that she is aware of.   is present and supportive.

## 2019-05-09 NOTE — ANESTHESIA PREPROCEDURE EVALUATION
Anesthesia Pre-Procedure Evaluation    Patient: Trinidad Hunter   MRN: [id number] : 1985          Preoperative Diagnosis: * No pre-op diagnosis entered *    * No procedures listed *    Past Medical History:   Diagnosis Date     Gastro-esophageal reflux disease without esophagitis     No Comments Provided     Past Surgical History:   Procedure Laterality Date     OTHER SURGICAL HISTORY      [id number],NO PREVIOUS SURGERY     OTHER SURGICAL HISTORY      10/24/2017,55405.0,[facility] DELIV W POSTPARTUM       Anesthesia Evaluation     . Pt has had prior anesthetic.     No history of anesthetic complications          ROS/MED HX    ENT/Pulmonary:  - neg pulmonary ROS     Neurologic:  - neg neurologic ROS     Cardiovascular:  - neg cardiovascular ROS       METS/Exercise Tolerance:  >4 METS   Hematologic:  - neg hematologic  ROS       Musculoskeletal:  - neg musculoskeletal ROS       GI/Hepatic:     (+) GERD Asymptomatic on medication,       Renal/Genitourinary:  - ROS Renal section negative       Endo:  - neg endo ROS       Psychiatric:  - neg psychiatric ROS       Infectious Disease:  - neg infectious disease ROS       Malignancy:      - no malignancy   Other:    (+) Possibly pregnant                    neg OB ROS            Physical Exam  Normal systems: cardiovascular, pulmonary and dental    Airway   Mallampati: I  TM distance: > 3 FB  Neck ROM: full  Mouth opening: > 3 cm    Dental     Cardiovascular   Rhythm and rate: regular and normal      Pulmonary    breath sounds clear to auscultation            Lab Results   Component Value Date    WBC 12.0 (H) 2019    HGB 12.3 2019    HCT 37.8 2019     2019       Preop Vitals  BP Readings from Last 3 Encounters:   19 118/74   19 116/82   19 122/82    Pulse Readings from Last 3 Encounters:   19 76   19 86   04/15/19 82      Resp Readings from Last 3 Encounters:   19 16   04/15/19 16   19 20     "SpO2 Readings from Last 3 Encounters:   05/09/19 98%      Temp Readings from Last 1 Encounters:   05/09/19 97.5  F (36.4  C) (Temporal)    Ht Readings from Last 1 Encounters:   02/26/19 1.626 m (5' 4\")      Wt Readings from Last 1 Encounters:   05/06/19 72.5 kg (159 lb 14.4 oz)    Estimated body mass index is 27.45 kg/m  as calculated from the following:    Height as of 2/26/19: 1.626 m (5' 4\").    Weight as of 5/6/19: 72.5 kg (159 lb 14.4 oz).       Anesthesia Plan      History & Physical Review      ASA Status:  2 .  OB Epidural Asa: 2   NPO Status:  > 6 hours    Plan for Epidural          Postoperative Care      Consents  Anesthetic plan, risks, benefits and alternatives discussed with:  Patient, Patient and Spouse..                 APRIL GLEASON CRNA  "

## 2019-05-09 NOTE — H&P
[facility]    History and Physical  Obstetrics and Gynecology     Date of Admission:  2019    Assessment & Plan   Trinidad Hunter is a 33 year old female who presents with term pregnancy for induction of labor.  ASSESSMENT:   IUP @ 39w0d for induction of labor.  Indication rapid labors.  NST reactive.  Category  I    PLAN:   Admit - see IP orders    Rafael Perez    History of Present Illness   Trinidad Hunter is a 33 year old female  39w0d  Estimated Date of Delivery: May 16, 2019 is calculated from No LMP recorded. Patient is pregnant. [facility]  for [facility] of [facility].  Indication term pregnancy with history of rapid labor.    PRENATAL COURSE  Prenatal course was essentially uncomplicated      Recent Labs   Lab Test 10/09/18  1351   ABO O   RH Pos   AS Neg     Rhogam not indicated   Recent Labs   Lab Test 10/09/18  1354   HEPBANG Nonreactive   HIAGAB Nonreactive   RUQIGG 27       Past Medical History    I have reviewed this patient's medical history and updated it with pertinent information if needed.   Past Medical History:   Diagnosis Date     Gastro-esophageal reflux disease without esophagitis     No Comments Provided       Past Surgical History   I have reviewed this patient's surgical history and updated it with pertinent information if needed.  Past Surgical History:   Procedure Laterality Date     OTHER SURGICAL HISTORY      [id number],NO PREVIOUS SURGERY     OTHER SURGICAL HISTORY      10/24/2017,29097.0,[facility] VAG DELIV W POSTPARTUM       Prior to Admission Medications   Prior to Admission Medications   Prescriptions Last Dose Informant Patient Reported? Taking?   Prenatal Vit-Fe Fumarate-FA (PRENATAL VITAMIN) 27-0.8 MG TABS   Yes No   Sig: Take 1 capsule by mouth   ferrous sulfate (FE TABS) 325 (65 Fe) MG EC tablet   Yes No   Sig: Take 325 mg by mouth      Facility-Administered Medications: None     Allergies   Allergies   Allergen Reactions      Sulfa Drugs Rash       Social History   I have reviewed this patient's social history and updated it with pertinent information if needed. Trinidad Hunter  reports that she has never smoked. She has never used smokeless tobacco. She reports that she does not drink alcohol or use drugs.    Family History   I have reviewed this patient's family history and updated it with pertinent information if needed.   No family history on file.    Immunization History   Immunizations are up to date    Physical Exam                      Vital Signs with Ranges       Abdomen: gravid, single vertex fetus, non-tender, EFW 7 lbs 0  Cervical Exam: 3/ 70/ Mid/ soft/ 0     Fetal Heart Tones: 140 baseline, moderate variablility, + accels, no decels and Category I    Constitutional: healthy, alert, active and no distress   Respiratory: No increased work of breathing, good air exchange, clear to auscultation bilaterally, no crackles or wheezing  Cardiovascular: Normal apical impulse, regular rate and rhythm, normal S1 and S2, no S3 or S4, and no murmur noted

## 2019-05-09 NOTE — ANESTHESIA PROCEDURE NOTES
Peripheral nerve/Neuraxial procedure note : epidural catheter  Pre-Procedure  Performed by  Edis Mcclain APRN CRNA   Location: OB    Procedure Times:5/9/2019 10:19 AM and 5/9/2019 10:59 AM  Pre-Anesthestic Checklist: patient identified, IV checked, risks and benefits discussed, informed consent, monitors and equipment checked, pre-op evaluation and at physician/surgeon's request    Timeout  Correct Patient: Yes   Correct Procedure: Yes   Correct Site: Yes   Correct Laterality: N/A   Correct Position: Yes   Site Marked: N/A   .   Procedure Documentation    Diagnosis:Labor pain.    Procedure:    Epidural catheter.  Insertion Site:L3-4  (midline approach) Injection technique: LORT air   Local skin infiltrated with mL of 1% lidocaine.  MATIAS at 5.5 cm     Patient Prep;mask, sterile gloves, chlorhexidine gluconate and isopropyl alcohol, patient draped.  .  Needle: Touhy needle Needle Gauge: 17.    Needle Length (Inches) 3.5  # of attempts: 2 (First attempt positive for heme) and  # of redirects:  2 (With first attempt.) .   Catheter: 20 G . .  Catheter threaded easily  .  12 cm at skin.   .    Assessment/Narrative  Paresthesias: Yes and Resolved (Right sided paresthesia with first attempt.).  .  .  Aspiration negative for heme or CSF  . Test dose of mL lidocaine 1.5% w/ 1:200,000 epinephrine at. Test dose negative for signs of intravascular, subdural or intrathecal injection.

## 2019-05-09 NOTE — PROGRESS NOTES
Pitocin initiated at 0905 at 2 milliunits/min after category I tracing obtained. Epidural placed per patient request prior to AROM with excellent pain relief. Rendon catheter inserted per RN. Intermittent variables and prolonged decels noted this afternoon. FHT's returned to baseline with repositioning, O2 via non-rebreather, and discontinuation of pitocin.  of single viable male over an intact perineum at 1544. Baby to mom's chest for skin to skin.

## 2019-05-09 NOTE — L&D DELIVERY NOTE
OB Vaginal Delivery Note    Trinidad Hunter MRN# [id number]   Age: 33 year old YOB: 1985       GA: 39w0d  GP:   Labor Complications: None   EBL: 300  mL  QBL:    Delivery Type: Vaginal, Spontaneous   ROM to Delivery Time: 4h 23m  Nallen Weight:      1 Minute 5 Minute 10 Minute   Apgar Totals:                     Delivery Details:  Trinidad Hunter, a 33 year old  female delivered a viable infant with apgars of    and   . Patient was fully dilated and pushing after    hours    minutes in active labor. Delivery was via vaginal, spontaneous  to a sterile field under    anesthesia. Infant delivered in vertex  right  occiput  anterior  position. Anterior and posterior shoulders delivered without difficulty. The cord was clamped, and cut. Cord blood was obtained in routine fashion with the following disposition:   .      Cord complications:     Placenta delivered at   . Placental disposition was Hospital disposal . Fundal massage performed and fundus found to be firm.     Episiotomy: none    Perineum, vagina, cervix were inspected, and the following lacerations were noted:   Perineal lacerations: none . Small left perineal seroma noted and drained.                      Excellent hemostasis was noted. Needle count correct. Infant and patient in delivery room in good and stable condition.        Rupture date/time: 19 1121   Rupture type:  Artificial Rupture of Membranes  Fluid color:  Clear  Fluid odor:  Normal     Delivery/Placenta Date and Time    Delivery Date:  19 Delivery Time:   3:44 PM      Labor Events and Shoulder Dystocia    Fetal Tracing Prior to Delivery:  Category 1  Shoulder dystocia present?:  Neg     Delivery (Maternal) (Provider to Complete) (806151)    Episiotomy:  None  Perineal lacerations:  None    Vaginal laceration?:  No    Cervical laceration?:  No    Est. blood loss (mL):  300     Blood Loss  Mother: Trinidad Hunter #[id number]   Start of Mother's Information    IO  Blood Loss  05/09/19 0344 - 05/09/19 1612    EBL (mL) Hospital Encounter 300 mL    Total  300 mL         End of Mother's Information  Mother: Trinidad Hunter N #[id number]         Delivery - Provider to Complete ([id number])    Delivering clinician:  Rafael Perez MD  Attempted Delivery Types (Choose all that apply):  Spontaneous Vaginal Delivery  Delivery Type (Choose the 1 that will go to the Birth History):  Vaginal, Spontaneous          Placenta    Delayed Cord Clamping:  Done  Removal:  Spontaneous  Disposition:  Hospital disposal     Presentation and Position    Presentation:  Vertex  Position:  Right Occiput Anterior           Rafael Perez MD

## 2019-05-09 NOTE — PROGRESS NOTES
Cx /0  EFM cat 1  AROM clear  Epidural in and working well    Anticipate     Rafael Perez MD FACOG  11:24 AM 2019

## 2019-05-10 LAB — HGB BLD-MCNC: 10.9 G/DL (ref 11.7–15.7)

## 2019-05-10 PROCEDURE — 25000132 ZZH RX MED GY IP 250 OP 250 PS 637: Performed by: OBSTETRICS & GYNECOLOGY

## 2019-05-10 PROCEDURE — 12000000 ZZH R&B MED SURG/OB

## 2019-05-10 PROCEDURE — 99207 ZZC NO CHARGE LOS: CPT | Performed by: OBSTETRICS & GYNECOLOGY

## 2019-05-10 PROCEDURE — 36415 COLL VENOUS BLD VENIPUNCTURE: CPT | Performed by: OBSTETRICS & GYNECOLOGY

## 2019-05-10 PROCEDURE — 85018 HEMOGLOBIN: CPT | Performed by: OBSTETRICS & GYNECOLOGY

## 2019-05-10 RX ADMIN — IBUPROFEN 800 MG: 400 TABLET, FILM COATED ORAL at 11:23

## 2019-05-10 RX ADMIN — ACETAMINOPHEN 650 MG: 325 TABLET, FILM COATED ORAL at 22:16

## 2019-05-10 RX ADMIN — DOCUSATE SODIUM 100 MG: 100 CAPSULE, LIQUID FILLED ORAL at 11:23

## 2019-05-10 RX ADMIN — IBUPROFEN 800 MG: 400 TABLET, FILM COATED ORAL at 04:55

## 2019-05-10 RX ADMIN — ACETAMINOPHEN 650 MG: 325 TABLET, FILM COATED ORAL at 13:26

## 2019-05-10 RX ADMIN — DOCUSATE SODIUM 100 MG: 100 CAPSULE, LIQUID FILLED ORAL at 22:13

## 2019-05-10 RX ADMIN — IBUPROFEN 800 MG: 400 TABLET, FILM COATED ORAL at 16:29

## 2019-05-10 NOTE — PROGRESS NOTES
[facility]    Post-Partum Progress Note    Assessment & Plan   Assessment:  Post-partum day #1  Normal spontaneous vaginal delivery    Doing well.    Plan:  Ambulation encouraged  Breast feeding strategies discussed  Pain control measures as needed  Anticipate discharge tomorrow    Rafael Perez     Interval History   Doing well.  Pain is well-controlled.  No fevers.  No history of foul-smelling vaginal discharge.  Good appetite.  Denies chest pain, shortness of breath, nausea or vomiting.  Vaginal bleeding is similar to a heavy menstrual flow.  Ambulatory.  Breastfeeding well.    Medications     - MEDICATION INSTRUCTIONS -       NO Rho (D) immune globulin (RhoGam) needed - mother Rh POSITIVE       - MEDICATION INSTRUCTIONS -       oxytocin in 0.9% NaCl Stopped (05/09/19 1715)     oxytocin in 0.9% NaCl         docusate sodium  100 mg Oral BID       Physical Exam   Temp: 97.3  F (36.3  C) Temp src: Temporal BP: 112/77 Pulse: 78   Resp: 16 SpO2: 97 %      There were no vitals filed for this visit.  Vital Signs with Ranges  Temp:  [96.4  F (35.8  C)-97.5  F (36.4  C)] 97.3  F (36.3  C)  Pulse:  [68-78] 78  Resp:  [16] 16  BP: ()/(44-81) 112/77  SpO2:  [90 %-100 %] 97 %  I/O last 3 completed shifts:  In: -   Out: 600 [Urine:300; Blood:300]    Uterine fundus is firm, non-tender and at the level of the umbilicus  Extremities Non-tender    Data   Recent Labs   Lab Test 05/09/19  0750   ABO O   RH Pos   AS Neg     Recent Labs   Lab Test 05/10/19  0425 05/09/19  0750   HGB 10.9* 12.3     Recent Labs   Lab Test 10/09/18  1354   RUQIGG 27

## 2019-05-10 NOTE — PLAN OF CARE
Assessments completed as charted. B/P: 118/59, T: 96.8, P: 68, R: 18. Rates pain: 3/10. Voiding without difficulty. Fundus: Midline U/1. Lochia: Light. Activity: normal activity. Infant feeding: Breast feeding going well.     LATCH Score:   Latch: 2 - Good Latch  Audible Swallowin - Spontaneous & frequent  Type of Nipple: (Breast/Nipple) 2 - Everted  Comfort: 2 - Soft, Nontender  Hold: 2 - No Assist   Total LATCH Score: 10    Postpartum breastfeeding assessment completed and education provided, see Patient Education Activity.  Items included in the education are:   proper positioning and latch  effectiveness of feeding  manual expression  handling and storing breastmilk  maintenance of breastfeeding for the first 6 months  sign/symptoms of infant feeding issues requiring referral to qualified health care provider  Postpartum care education provided, see Patient Education activity. Patient denies needs. Will monitor.  Socorro Morfin RN

## 2019-05-10 NOTE — ANESTHESIA POSTPROCEDURE EVALUATION
Patient: Trinidad Hunter    * No procedures listed *    Diagnosis:* No pre-op diagnosis entered *  Diagnosis Additional Information: No value filed.    Anesthesia Type:  Epidural    Note:  Anesthesia Post Evaluation    Patient location during evaluation: Bedside  Patient participation: Able to fully participate in evaluation  Level of consciousness: awake and alert  Pain management: adequate  Airway patency: patent  Cardiovascular status: acceptable  Respiratory status: acceptable  Hydration status: acceptable  PONV: none     Anesthetic complications: None    Comments: Patient was happy with her epidural.  She has no residual numbness or tingling in her lower extremities.  She has been up walking around.  She has voided.  She reports no fever or chills.          Last vitals:  Vitals:    05/09/19 1750 05/09/19 2325 05/10/19 0756   BP:  118/59 112/77   Pulse:  68 78   Resp: 16 16 16   Temp: 96.4  F (35.8  C) 96.8  F (36  C) 97.3  F (36.3  C)   SpO2:  98% 97%         Electronically Signed By: APRIL GLEASON CRNA  May 10, 2019  8:27 AM

## 2019-05-11 VITALS
RESPIRATION RATE: 16 BRPM | SYSTOLIC BLOOD PRESSURE: 120 MMHG | OXYGEN SATURATION: 98 % | DIASTOLIC BLOOD PRESSURE: 61 MMHG | HEART RATE: 64 BPM | TEMPERATURE: 97.3 F

## 2019-05-11 LAB — T PALLIDUM AB SER QL: NONREACTIVE

## 2019-05-11 PROCEDURE — 25000132 ZZH RX MED GY IP 250 OP 250 PS 637: Performed by: OBSTETRICS & GYNECOLOGY

## 2019-05-11 RX ORDER — IBUPROFEN 800 MG/1
800 TABLET, FILM COATED ORAL EVERY 8 HOURS PRN
Qty: 50 TABLET | Refills: 1 | Status: SHIPPED | OUTPATIENT
Start: 2019-05-11 | End: 2019-06-20

## 2019-05-11 RX ADMIN — DOCUSATE SODIUM 100 MG: 100 CAPSULE, LIQUID FILLED ORAL at 07:41

## 2019-05-11 RX ADMIN — IBUPROFEN 800 MG: 400 TABLET, FILM COATED ORAL at 09:10

## 2019-05-11 RX ADMIN — IBUPROFEN 800 MG: 400 TABLET, FILM COATED ORAL at 04:13

## 2019-05-11 RX ADMIN — ACETAMINOPHEN 650 MG: 325 TABLET, FILM COATED ORAL at 07:41

## 2019-05-11 NOTE — DISCHARGE SUMMARY
Admitting Diagnosis: Para 2, 39 weeks, hx of fast labors  Discharge Diagnosis: same, viable infant male  Delivering Physician: ADRIAN  Primary Physician: Rasta    Clinical and Delivery History: 33 year old  admitted at 39 weeks for induction.  Proceeded on to a  of a vigorous infant male (Brandt).  Uncomplicated.    Post Partum Course: Uncomplicated  Breast feeding    Hgb: 10.9    Rhogam: O Pos  MMR: not indicated    Follow up: 6 weeks

## 2019-05-11 NOTE — DISCHARGE SUMMARY
[place] [facility] DISCHARGE NOTE    Patient discharged to home at 0915 AM via ambulation. Accompanied by spouse and other:nurse and staff. Discharge instructions reviewed with patient and spouse, opportunity offered to ask questions. Prescriptions sent to patients preferred pharmacy. All belongings sent with patient.    Kyleigh Guerra

## 2019-05-11 NOTE — DISCHARGE INSTRUCTIONS
If BREASTFEEDING;  Have a nodule in your breast (Blocked ducts)  *use moist heat and gentle breast massage before feedings (try this in the shower)  *Nurse the baby frequently on the plugged duct side  *Nurse with the baby's chin pointing in the direction of the blocked duct to help drain the affected area  *Massage breast down toward the nipple to help your milk flow when infant nursing  *Rest as much as possible     MASTITIS  Breast infection. Symptoms include a painful, firm red area on your breast, a fever and flu-like symptoms. Nipple damage, infrequent or poor breastfeeding, blocked ducts can cause mastitis.   *Call your healthcare provider  *Take your full coarse of antibiotics  *Rest and drink fluids  *Continue feeding baby from both breasts  *If too painful pump and cup feed infant  *Use heat for comfort  *Get advice from your lactation consultant    ENGORGEMENT  Milk production increases. Your breast become wilkinson, heavier, and firmer. Nursing often and draining your breasts regularly can help prevent excessive swelling. Ice may feel comfortable. Wear a bra with good support and easy to open cups.Take a refridgerated cabbage, break the veins of a leaf and place to breast for swelling.  May need to pump slightly to relieve your pain for comfort and cup feeding it to your infant.    SORE NIPPLES  *Wash your hands before touching breast    *Start feeding on the breast that is the least sore, move to the other side once your milk has let down  *Make sure infant is tummy to tummy with you and she is hugging your breat with a deep latch (fish lipped)  *May need to pump and cup feed infant  *Nipple shield   *Warm pack or gel packs for comfort  *Rub breast milk into sore nipple-air dry  *Place a wet peppermint/spearmint tea bag to nipple

## 2019-05-11 NOTE — PLAN OF CARE
VSS. PRN acetaminophen and ibuprofen administered for cramping with adequate relief, see MAR. Patient bonding well with baby.  Breastfeeding is going well upon assessment and per patient report.  Patient watched [employer] and denies current questions or concerns. Refer to flowsheets for further vital signs and assessments. Will continue to monitor.

## 2019-05-11 NOTE — PROGRESS NOTES
Doing well PPD#2  General diet  Lochia normal  Voiding without difficulty  AVSS  Uterus firm    A: Stable PPD#2  P: Continue postpartum care  Routine instructions  Home today

## 2019-05-13 ENCOUNTER — HOSPITAL ENCOUNTER (OUTPATIENT)
Dept: OBGYN | Facility: OTHER | Age: 34
End: 2019-05-13
Attending: OBSTETRICS & GYNECOLOGY
Payer: COMMERCIAL

## 2019-05-13 ENCOUNTER — LACTATION ENCOUNTER (OUTPATIENT)
Age: 34
End: 2019-05-13

## 2019-05-13 PROCEDURE — S9443 LACTATION CLASS: HCPCS | Performed by: REGISTERED NURSE

## 2019-05-13 NOTE — LACTATION NOTE
This note was copied from a baby's chart.  Outpatient Lactation Visit    Jaret Hunter  [id number]    Consultation Date: May 13, 2019     Reason for Lactation Referral: Initial Lactation Consult    Baby's : 2019    Baby's Current Age: 4 day old  Baby's Gestational Age: Gestational Age: 39w0d    Primary Care Provider: Tanner Mott    Presenting Problem (concerns as stated by parent): no concerns    MATERNAL HISTORY   History of Breast Surgery: no  Breast Changes During Pregnancy: no  Breast Feeding History: nursed other 2 children for about 10 months  Maternal Meds: daily prenatal vitamin  Pregnancy Complications: none  Anesthesia during labor: epidural    MATERNAL ASSESSMENT    Breast Size: average, symmetrical, soft after feeding and filling prior to feeding  Nipple Appearance - Left: slightly cracked, with signs of healing, education on further healing techniques provided  Nipple Appearance - Right: slightly cracked, with signs of healing, education on further healing techniques provided  Nipple Erectility - Left: erect with stimulation  Nipple Erectility - Right: erect with stimulation  Areolas Compressibility: soft  Nipple Size: average  Special Equipment Used: none  Day mother reports milk came in:  Day 3    INFANT ASSESSMENT    Oral Anatomy  Mouth: normal  Palate: normal  Jaw: normal  Tongue: normal  Frenulum: normal   Digital Suck Exam: root    FEEDING   Feeding Time: aggressively for 30 minutes  Position:  cradle  Effort to Latch: awake and alert, latched easily  Duration of Breast Feeding: Right Breast: 20 minutes; Left Breast: 10 minutes  Results: excellent breast feed    Volume of Intake:    Birth Weight: 7 lb 3.2 oz    Hospital discharge weight: 6 lb 10.8 oz    Today's Weight 6 lb 12.6 oz    Total Intake: 1.4 oz  Output: 3-4 soil diapers in last 24 hours, 3-4 wet diapers in last 24 hours    LATCH Score:   Latch: 2 - Good Latch  Audible Swallowin - Spontaneous & frequent  Type of  Nipple: (Breast/Nipple) 2 - Everted  Comfort: 2 - Soft, Nontender  Hold: 2 - No Assist   Total LATCH Score:  10    FEEDING PLAN    Home Feeding Plan: Continue to feed on demand when  elicits feeding cues with deep latch.  Babe should be eating 8-12 times in a 24 hour period.  Exclusivity explained and encouraged in the early weeks to establish breastfeeding and order in milk supply.  Rooming-in encouraged with explanation of the benefits.  Continue to apply expressed breast milk and Lanolin cream to nipples after feedings for healing and comfort.  Postpartum breastfeeding assessment completed and education provided.  Items included in the education are:     proper positioning and latch    effectiveness of feeding    manual expression    handling and storing breastmilk    maintenance of breastfeeding for the first 6 months    sign/symptoms of infant feeding issues requiring referral to qualified health care provider    LACTATION COMMENTS   Deep latch explained for proper positioning of breast in infant's mouth, maximizing milk transfer and comfort.  Reassurance and encouragement provided in regard to mom's concerns about milk supply.  Follow-up support information provided.  Parents plan to keep [place] Well-Child Check with Dr. Mott as scheduled for 2 week well child check.      Face-to-face Time: 60 minutes with assessment and education.    Lucia Julio  2019  10:34 AM

## 2019-06-20 ENCOUNTER — PRENATAL OFFICE VISIT (OUTPATIENT)
Dept: OBGYN | Facility: OTHER | Age: 34
End: 2019-06-20
Attending: OBSTETRICS & GYNECOLOGY
Payer: COMMERCIAL

## 2019-06-20 VITALS
SYSTOLIC BLOOD PRESSURE: 122 MMHG | BODY MASS INDEX: 24.72 KG/M2 | DIASTOLIC BLOOD PRESSURE: 82 MMHG | WEIGHT: 144 LBS | HEART RATE: 78 BPM

## 2019-06-20 DIAGNOSIS — Z30.430 ENCOUNTER FOR IUD INSERTION: Primary | ICD-10-CM

## 2019-06-20 PROCEDURE — 25000125 ZZHC RX 250: Performed by: OBSTETRICS & GYNECOLOGY

## 2019-06-20 PROCEDURE — 58300 INSERT INTRAUTERINE DEVICE: CPT | Performed by: OBSTETRICS & GYNECOLOGY

## 2019-06-20 RX ADMIN — LEVONORGESTREL 20 MCG: 52 INTRAUTERINE DEVICE INTRAUTERINE at 13:40

## 2019-06-20 ASSESSMENT — PATIENT HEALTH QUESTIONNAIRE - PHQ9: SUM OF ALL RESPONSES TO PHQ QUESTIONS 1-9: 0

## 2019-06-20 ASSESSMENT — PAIN SCALES - GENERAL: PAINLEVEL: NO PAIN (0)

## 2019-06-20 NOTE — PROGRESS NOTES
CC: postpartum exam at 6 weeks from delivery    HPI: Trinidad Hunter presents for postpartum exam. Baby was born by vaginal delivery. Complications included none.  Mood:OK    Breastfeeding:yes  Incision(s): no  Bleeding: no  Birthcontrol: Mirena IUD    Past Medical History:   Diagnosis Date     Gastro-esophageal reflux disease without esophagitis     No Comments Provided     Past Surgical History:   Procedure Laterality Date     OTHER SURGICAL HISTORY      [id number],NO PREVIOUS SURGERY     OTHER SURGICAL HISTORY      10/24/2017,68635.0,CO OBSTE CARE VAG DELIV W POSTPARTUM     Current Outpatient Medications   Medication     calcium carbonate-vitamin D (OS-LIZA) 600-400 MG-UNIT chewable tablet     Prenatal Vit-Fe Fumarate-FA (PRENATAL VITAMIN) 27-0.8 MG TABS     No current facility-administered medications for this visit.       Allergies   Allergen Reactions     Sulfa Drugs Rash       REVIEW OF SYSTEMS:  Neg for bowel, bladder, and breast    O: /82 (BP Location: Right arm)   Pulse 78   Wt 65.3 kg (144 lb)   Breastfeeding? Yes   BMI 24.72 kg/m    Body mass index is 24.72 kg/m .    Exam:  Constitutional: healthy, alert, active and no distress  Pelvic exam: normal vagina and vulva, normal cervix without lesions or tenderness, uterus normal size anteverted, adenxa normal in size without tenderness, exam chaperoned by nurse.    PHQ-9 score:    PHQ-9 SCORE 6/20/2019   PHQ-9 Total Score 0       Results for orders placed or performed during the hospital encounter of 05/09/19   CBC with platelets differential   Result Value Ref Range    WBC 12.0 (H) 4.0 - 11.0 10e9/L    RBC Count 4.49 3.8 - 5.2 10e12/L    Hemoglobin 12.3 11.7 - 15.7 g/dL    Hematocrit 37.8 35.0 - 47.0 %    MCV 84 78 - 100 fl    MCH 27.4 26.5 - 33.0 pg    MCHC 32.5 31.5 - 36.5 g/dL    RDW 14.6 10.0 - 15.0 %    Platelet Count 177 150 - 450 10e9/L    Diff Method Automated Method     % Neutrophils 68.2 %    % Lymphocytes 22.5 %    % Monocytes 5.6 %    %  Eosinophils 2.0 %    % Basophils 0.7 %    % Immature Granulocytes 1.0 %    Absolute Neutrophil 8.2 1.6 - 8.3 10e9/L    Absolute Lymphocytes 2.7 0.8 - 5.3 10e9/L    Absolute Monocytes 0.7 0.0 - 1.3 10e9/L    Absolute Eosinophils 0.2 0.0 - 0.7 10e9/L    Absolute Basophils 0.1 0.0 - 0.2 10e9/L    Abs Immature Granulocytes 0.1 0 - 0.4 10e9/L   Treponema Abs w Reflex to RPR and Titer   Result Value Ref Range    Treponema Antibodies Nonreactive NR^Nonreactive   Hemoglobin   Result Value Ref Range    Hemoglobin 10.9 (L) 11.7 - 15.7 g/dL   ABO/Rh type and screen   Result Value Ref Range    ABO O     RH(D) Pos     Antibody Screen Neg     Test Valid Only At [facility]        Specimen Expires 05/12/2019      IUD placement:    After consent was obtained and timeout performed, the patient was positioned in dorsal lithotomy. Bimanual exam confirms a anteverted uterus. A speculum was inserted. The cervix was prepped withbetadine. The cervix was grasped on the anterior lip and the uterus sounded to 8 cm. The Mirena IUD was inserted to 8 cm without difficulty and easily deployed. The strings were trimmed to 2 cm. The cervix was released from the tenaculum. The cervix was made hemostatic with silver nitrate. The patient tolerated the procedure. No complications.        Postpartum visit.    Resume annual preventive healthcare. Continue PNV's until done with childbearing.    She was instructed to return in one month for a string check unless she can feel them herself.Removal or replacement is recommended in 5 years from 6/20/2019. She is to call with signs of heavy bleeding, foul discharge, pain, or expulsion of the IUD, or inability to feel her strings.    RTC prn    Rafael Perez MD FACOG  1:23 PM 6/20/2019

## 2019-11-20 ENCOUNTER — TELEPHONE (OUTPATIENT)
Dept: INTERNAL MEDICINE | Facility: OTHER | Age: 34
End: 2019-11-20

## 2019-11-20 DIAGNOSIS — L98.9 SKIN LESION: Primary | ICD-10-CM

## 2019-11-20 NOTE — TELEPHONE ENCOUNTER
Contacted the patient and gave her the information below.  Maritza Castellanos LPN on 11/20/2019 at 4:20 PM

## 2019-11-20 NOTE — TELEPHONE ENCOUNTER
Patient called requesting a referral for Dermatology.  She has two moles that seem to have had a slight change in color and appearance and she would like to have them checked.  She would like to see Dr. Mcdermott with [facility] at the [facility].  Thank you!  Alfreda Lopez on 11/20/2019 at 10:18 AM

## 2019-12-16 NOTE — TELEPHONE ENCOUNTER
Patient returning call to nurse   Prevacid   LOV-12/14/2016-Patient has had multiply prenatal visits since.   Due for exam.  Limited refill per protocol and letter mailed.  Krista Galavn ........   4/19/2018    10:53 AM

## 2020-03-11 ENCOUNTER — HEALTH MAINTENANCE LETTER (OUTPATIENT)
Age: 35
End: 2020-03-11

## 2020-12-23 ENCOUNTER — ALLIED HEALTH/NURSE VISIT (OUTPATIENT)
Dept: FAMILY MEDICINE | Facility: OTHER | Age: 35
End: 2020-12-23
Attending: INTERNAL MEDICINE
Payer: COMMERCIAL

## 2020-12-23 DIAGNOSIS — Z23 NEED FOR PROPHYLACTIC VACCINATION AND INOCULATION AGAINST INFLUENZA: Primary | ICD-10-CM

## 2020-12-23 PROCEDURE — 90471 IMMUNIZATION ADMIN: CPT

## 2020-12-23 PROCEDURE — 90686 IIV4 VACC NO PRSV 0.5 ML IM: CPT

## 2021-04-25 ENCOUNTER — HEALTH MAINTENANCE LETTER (OUTPATIENT)
Age: 36
End: 2021-04-25

## 2021-10-09 ENCOUNTER — HEALTH MAINTENANCE LETTER (OUTPATIENT)
Age: 36
End: 2021-10-09

## 2022-01-03 ENCOUNTER — ALLIED HEALTH/NURSE VISIT (OUTPATIENT)
Dept: FAMILY MEDICINE | Facility: OTHER | Age: 37
End: 2022-01-03
Payer: COMMERCIAL

## 2022-01-03 DIAGNOSIS — Z23 NEED FOR PROPHYLACTIC VACCINATION AND INOCULATION AGAINST INFLUENZA: Primary | ICD-10-CM

## 2022-01-03 PROCEDURE — 90686 IIV4 VACC NO PRSV 0.5 ML IM: CPT

## 2022-01-03 PROCEDURE — 90471 IMMUNIZATION ADMIN: CPT

## 2022-05-02 ENCOUNTER — OFFICE VISIT (OUTPATIENT)
Dept: FAMILY MEDICINE | Facility: OTHER | Age: 37
End: 2022-05-02
Attending: NURSE PRACTITIONER
Payer: COMMERCIAL

## 2022-05-02 VITALS
DIASTOLIC BLOOD PRESSURE: 62 MMHG | TEMPERATURE: 98.1 F | HEART RATE: 59 BPM | RESPIRATION RATE: 16 BRPM | HEIGHT: 64 IN | WEIGHT: 138 LBS | SYSTOLIC BLOOD PRESSURE: 110 MMHG | BODY MASS INDEX: 23.56 KG/M2 | OXYGEN SATURATION: 98 %

## 2022-05-02 DIAGNOSIS — H65.111 ACUTE ALLERGIC SEROUS OTITIS MEDIA, RIGHT: Primary | ICD-10-CM

## 2022-05-02 DIAGNOSIS — J30.2 SEASONAL ALLERGIES: ICD-10-CM

## 2022-05-02 PROCEDURE — 99203 OFFICE O/P NEW LOW 30 MIN: CPT | Performed by: INTERNAL MEDICINE

## 2022-05-02 ASSESSMENT — ENCOUNTER SYMPTOMS
SHORTNESS OF BREATH: 0
COUGH: 0
FEVER: 0
CHILLS: 0
SINUS PRESSURE: 1
ABDOMINAL PAIN: 0

## 2022-05-02 ASSESSMENT — PAIN SCALES - GENERAL: PAINLEVEL: MILD PAIN (3)

## 2022-05-02 NOTE — NURSING NOTE
"Chief Complaint   Patient presents with     Ear Problem     Patient presented to the clinic with right ear pain that started last Tuesday and has not gotten any better.    Initial /62 (BP Location: Left arm, Patient Position: Sitting, Cuff Size: Adult Regular)   Pulse 59   Temp 98.1  F (36.7  C) (Tympanic)   Resp 16   Ht 1.626 m (5' 4\")   Wt 62.6 kg (138 lb)   LMP 03/27/2022   SpO2 98%   Breastfeeding No   BMI 23.69 kg/m   Estimated body mass index is 23.69 kg/m  as calculated from the following:    Height as of this encounter: 1.626 m (5' 4\").    Weight as of this encounter: 62.6 kg (138 lb).       FOOD SECURITY SCREENING QUESTIONS:    The next two questions are to help us understand your food security.  If you are feeling you need any assistance in this area, we have resources available to support you today.    Hunger Vital Signs:  Within the past 12 months we worried whether our food would run out before we got money to buy more. Never  Within the past 12 months the food we bought just didn't last and we didn't have money to get more. Never      Medication Reconciliation: Complete      Rasheeda Parra LPN  "

## 2022-05-02 NOTE — PATIENT INSTRUCTIONS
Consider trial of Zyrtec, Claritin, or Allegra -- as needed to help with allergies.      Consider trial of Afrin nasal spray for a couple days - follow box instructions.       Consider Flonase or Nasonex -- steroid nasal spray.   -- for nasal allergies, if oral meds are not working.       If above are not helping, consider trial of Singulair or Zafirlukast, prescription.         Return as needed for follow-up for new / worsening symptoms.    Clinic : [phone]  Appointment line: [phone]

## 2022-05-02 NOTE — PROGRESS NOTES
"Assessment & Plan       ICD-10-CM    1. Acute allergic serous otitis media, right  H65.111    2. Seasonal allergies  J30.2      Patient presents with symptoms that started up on Tuesday last week.  She was having increase in seasonal allergy symptoms with some congestion.  Ended up getting more ear pain that was quite severe on the 26th and 27 April.  When she lays down she feels like there is fluid sounds in her right ear and sometimes feels like something is dripping from her ear.  Nothing is actually draining.    She did restart her Zyrtec on 426.  Otherwise had not been using it.    Denies fevers or chills.  No cough.  No unilateral sinus pain.  No shortness of breath.  No cough.    She has serous otitis in the right ear on examination.  Discussed treatment options.  Consider Allegra or Claritin if Zyrtec is not working.  Otherwise consider Afrin nasal spray if having sinus congestion.  If more allergy symptoms or allergy concerns would consider starting Flonase or Nasonex.    No signs of infection at this time.  No need for antibiotics.    She is happy with plan and will follow-up as needed for new or worsening symptoms.    Return for Return as needed for new or worsening symptoms, Appointments: [phone].    Bill Thurman MD  [facility]   Trinidad is a 36 year old who presents for the following health issues     HPI     Review of Systems   Constitutional: Negative for chills and fever.   HENT: Positive for ear pain and sinus pressure.    Respiratory: Negative for cough and shortness of breath.    Gastrointestinal: Negative for abdominal pain.   Allergic/Immunologic: Positive for environmental allergies.            Objective    /62 (BP Location: Left arm, Patient Position: Sitting, Cuff Size: Adult Regular)   Pulse 59   Temp 98.1  F (36.7  C) (Tympanic)   Resp 16   Ht 1.626 m (5' 4\")   Wt 62.6 kg (138 lb)   LMP 03/27/2022   SpO2 98%   Breastfeeding No   BMI " 23.69 kg/m    Body mass index is 23.69 kg/m .  Physical Exam  Constitutional:       Appearance: She is not ill-appearing.   HENT:      Right Ear: Tympanic membrane, ear canal and external ear normal.      Left Ear: Ear canal and external ear normal.      Ears:      Comments: Serous otitis on right  Eyes:      General: No scleral icterus.     Conjunctiva/sclera: Conjunctivae normal.   Skin:     Findings: No rash.   Neurological:      General: No focal deficit present.      Mental Status: She is alert.

## 2022-05-21 ENCOUNTER — HEALTH MAINTENANCE LETTER (OUTPATIENT)
Age: 37
End: 2022-05-21

## 2022-07-25 ENCOUNTER — OFFICE VISIT (OUTPATIENT)
Dept: FAMILY MEDICINE | Facility: OTHER | Age: 37
End: 2022-07-25
Attending: FAMILY MEDICINE
Payer: COMMERCIAL

## 2022-07-25 VITALS
HEIGHT: 64 IN | TEMPERATURE: 98.3 F | HEART RATE: 88 BPM | WEIGHT: 136.8 LBS | RESPIRATION RATE: 16 BRPM | OXYGEN SATURATION: 99 % | DIASTOLIC BLOOD PRESSURE: 82 MMHG | SYSTOLIC BLOOD PRESSURE: 128 MMHG | BODY MASS INDEX: 23.35 KG/M2

## 2022-07-25 DIAGNOSIS — Z00.00 PHYSICAL EXAM, ANNUAL: Primary | ICD-10-CM

## 2022-07-25 DIAGNOSIS — Z30.431 IUD CHECK UP: ICD-10-CM

## 2022-07-25 LAB
CHOLEST SERPL-MCNC: 224 MG/DL
FASTING STATUS PATIENT QL REPORTED: YES
HDLC SERPL-MCNC: 63 MG/DL (ref 23–92)
LDLC SERPL CALC-MCNC: 148 MG/DL
NONHDLC SERPL-MCNC: 161 MG/DL
TRIGL SERPL-MCNC: 64 MG/DL

## 2022-07-25 PROCEDURE — G0123 SCREEN CERV/VAG THIN LAYER: HCPCS | Performed by: FAMILY MEDICINE

## 2022-07-25 PROCEDURE — 36415 COLL VENOUS BLD VENIPUNCTURE: CPT | Mod: ZL | Performed by: FAMILY MEDICINE

## 2022-07-25 PROCEDURE — 99395 PREV VISIT EST AGE 18-39: CPT | Performed by: FAMILY MEDICINE

## 2022-07-25 PROCEDURE — 87624 HPV HI-RISK TYP POOLED RSLT: CPT | Mod: ZL | Performed by: FAMILY MEDICINE

## 2022-07-25 PROCEDURE — 80061 LIPID PANEL: CPT | Mod: ZL | Performed by: FAMILY MEDICINE

## 2022-07-25 ASSESSMENT — ENCOUNTER SYMPTOMS
SORE THROAT: 0
DIZZINESS: 0
NERVOUS/ANXIOUS: 0
SHORTNESS OF BREATH: 0
HEARTBURN: 0
WEAKNESS: 0
HEMATURIA: 0
NAUSEA: 0
DYSURIA: 0
HEADACHES: 0
FREQUENCY: 0
CONSTIPATION: 0
ABDOMINAL PAIN: 0
BREAST MASS: 0
PALPITATIONS: 0
HEMATOCHEZIA: 0
COUGH: 0
JOINT SWELLING: 0
CHILLS: 0
MYALGIAS: 0
FEVER: 0
EYE PAIN: 0
ARTHRALGIAS: 0
DIARRHEA: 0
PARESTHESIAS: 0

## 2022-07-25 ASSESSMENT — PAIN SCALES - GENERAL: PAINLEVEL: NO PAIN (0)

## 2022-07-25 NOTE — NURSING NOTE
"Chief Complaint   Patient presents with     Physical     Patient is here for annual physical and pap smear     Initial /82   Pulse 88   Temp 98.3  F (36.8  C) (Tympanic)   Resp 16   Ht 1.626 m (5' 4\")   Wt 62.1 kg (136 lb 12.8 oz)   LMP 07/14/2022   SpO2 99%   Breastfeeding No   BMI 23.48 kg/m   Estimated body mass index is 23.48 kg/m  as calculated from the following:    Height as of this encounter: 1.626 m (5' 4\").    Weight as of this encounter: 62.1 kg (136 lb 12.8 oz).  Medication Reconciliation: complete    Socorro Vasquez MA       FOOD SECURITY SCREENING QUESTIONS:    The next two questions are to help us understand your food security.  If you are feeling you need any assistance in this area, we have resources available to support you today.    Hunger Vital Signs:  Within the past 12 months we worried whether our food would run out before we got money to buy more. Never  Within the past 12 months the food we bought just didn't last and we didn't have money to get more. Never  Socorro Vasquez MA,LPN on 7/25/2022 at 8:30 AM      "

## 2022-07-25 NOTE — PROGRESS NOTES
SUBJECTIVE:   CC: Trinidad Hunter is an 37 year old woman who presents for preventive health visit.       Patient has been advised of split billing requirements and indicates understanding: Yes  Healthy Habits:     Getting at least 3 servings of Calcium per day:  Yes    Bi-annual eye exam:  NO    Dental care twice a year:  Yes    Sleep apnea or symptoms of sleep apnea:  None    Diet:  Regular (no restrictions)    Frequency of exercise:  4-5 days/week    Duration of exercise:  30-45 minutes    Taking medications regularly:  Yes    Medication side effects:  Not applicable and None    PHQ-2 Total Score: 0    Additional concerns today:  No    Patient presents today for her physical exam.  She states she has not had a checkup since her postpartum check in 2019.  She has no acute care concerns today.      PROBLEMS TO ADD ON...  1.  Mirena IUD  2019, light menses, doesn't always get cramping      Today's PHQ-2 Score:   PHQ-2 ( 1999 Pf[employer]) 7/25/2022   Q1: Little interest or pleasure in doing things 0   Q2: Feeling down, depressed or hopeless 0   PHQ-2 Score 0   PHQ-2 Total Score (12-17 Years)- Positive if 3 or more points; Administer PHQ-A if positive -   Q1: Little interest or pleasure in doing things Not at all   Q2: Feeling down, depressed or hopeless Not at all   PHQ-2 Score 0       Abuse: Current or Past (Physical, Sexual or Emotional) - No  Do you feel safe in your environment? Yes    Have you ever done Advance Care Planning? (For example, a Health Directive, POLST, or a discussion with a medical provider or your loved ones about your wishes): No, advance care planning information given to patient to review.  Patient declined advance care planning discussion at this time.    Social History     Tobacco Use     Smoking status: Never Smoker     Smokeless tobacco: Never Used   Substance Use Topics     Alcohol use: Yes     Alcohol/week: 5.0 standard drinks     Types: 5 Standard drinks or equivalent per week     Comment:  5-7 summer/ winter         Alcohol Use 2022   Prescreen: >3 drinks/day or >7 drinks/week? No       Reviewed orders with patient.  Reviewed health maintenance and updated orders accordingly - Yes      Breast Cancer Screening:  Any new diagnosis of family breast, ovarian, or bowel cancer? No    FHS-7: No flowsheet data found.      Pertinent mammograms are reviewed under the imaging tab.    History of abnormal Pap smear: NO - age 30-65 PAP every 5 years with negative HPV co-testing recommended     Reviewed and updated as needed this visit by clinical staff   Tobacco  Allergies  Meds     Fam Hx  Soc Hx          Reviewed and updated as needed this visit by Provider         Fam Hx           Past Medical History:   Diagnosis Date     Gastro-esophageal reflux disease without esophagitis     No Comments Provided      Past Surgical History:   Procedure Laterality Date     OTHER SURGICAL HISTORY      [id number],NO PREVIOUS SURGERY     OTHER SURGICAL HISTORY      10/24/2017,31450.0,IN OBSTE CARE VAG DELIV W POSTPARTUM     OB History    Para Term  AB Living   3 3 3 0 0 3   SAB IAB Ectopic Multiple Live Births   0 0 0 0 3      # Outcome Date GA Lbr Steven/2nd Weight Sex Delivery Anes PTL Lv   3 Term 19 39w0d 04:00 / 00:23 3.266 kg (7 lb 3.2 oz) M Vag-Spont EPI N LADI      Name: PALMER,MALE-SHARDA      Apgar1: 8  Apgar5: 9   2 Term 10/24/17   2.693 kg (5 lb 15 oz) M IVD EPI N LADI      Name: Osvaldo Hunter    1 Term 14 40w6d  2.91 kg (6 lb 6.7 oz) F Vag-Spont EPI N LADI      Name: PALMER,BABY GIRL      Apgar1: 9  Apgar5: 9       Review of Systems   Constitutional: Negative for chills and fever.   HENT: Negative for congestion, ear pain, hearing loss and sore throat.    Eyes: Negative for pain and visual disturbance.   Respiratory: Negative for cough and shortness of breath.    Cardiovascular: Negative for chest pain, palpitations and peripheral edema.   Gastrointestinal: Negative for abdominal pain,  "constipation, diarrhea, heartburn, hematochezia and nausea.   Breasts:  Negative for tenderness, breast mass and discharge.   Genitourinary: Negative for dysuria, frequency, genital sores, hematuria, pelvic pain, urgency, vaginal bleeding and vaginal discharge.   Musculoskeletal: Negative for arthralgias, joint swelling and myalgias.   Skin: Negative for rash.   Neurological: Negative for dizziness, weakness, headaches and paresthesias.   Psychiatric/Behavioral: Negative for mood changes. The patient is not nervous/anxious.      Dentist  - less than 6 months ago   Heartburn and reflux if stressed  - medication in the past   Went to derm - previous mole removals       OBJECTIVE:   /82   Pulse 88   Temp 98.3  F (36.8  C) (Tympanic)   Resp 16   Ht 1.626 m (5' 4\")   Wt 62.1 kg (136 lb 12.8 oz)   LMP 07/14/2022   SpO2 99%   Breastfeeding No   BMI 23.48 kg/m    Physical Exam  GENERAL: healthy, alert and no distress  EYES: Eyes grossly normal to inspection, PERRL and conjunctivae and sclerae normal  HENT: ear canals and TM's normal, nose and mouth without ulcers or lesions  NECK: no adenopathy, no asymmetry, masses, or scars and thyroid normal to palpation  RESP: lungs clear to auscultation - no rales, rhonchi or wheezes  BREAST: normal without masses, tenderness or nipple discharge and no palpable axillary masses or adenopathy  CV: regular rate and rhythm, normal S1 S2, no S3 or S4, no murmur, click or rub, no peripheral edema and peripheral pulses strong  ABDOMEN: soft, nontender  :  Pap smear today   - IUD strings visualized   MS: no gross musculoskeletal defects noted, no edema  SKIN: no suspicious lesions or rashes  NEURO: Normal strength and tone, mentation intact and speech normal  PSYCH: mentation appears normal, affect normal/bright        ASSESSMENT/PLAN:       ICD-10-CM    1. Physical exam, annual  Z00.00 Pap Screen with HPV - recommended age 30 - 65 years     Lipid Profile     Lipid Profile " "  2. IUD check up  Z30.431        1.  IUD appears to be in place.  2. Pap smear obtained today  3.  Lipid check today due to FH       COUNSELING:  Reviewed preventive health counseling, as reflected in patient instructions    Estimated body mass index is 23.48 kg/m  as calculated from the following:    Height as of this encounter: 1.626 m (5' 4\").    Weight as of this encounter: 62.1 kg (136 lb 12.8 oz).        She reports that she has never smoked. She has never used smokeless tobacco.      Counseling Resources:  ATP IV Guidelines  Pooled Cohorts Equation Calculator  Breast Cancer Risk Calculator  BRCA-Related Cancer Risk Assessment: FHS-7 Tool  FRAX Risk Assessment  ICSI Preventive Guidelines  Dietary Guidelines for Americans, 2010  USDA's MyPlate  ASA Prophylaxis  Lung CA Screening    MICHAEL PENA MD  [facility]  "

## 2022-07-27 LAB
BKR LAB AP GYN ADEQUACY: NORMAL
BKR LAB AP GYN INTERPRETATION: NORMAL
BKR LAB AP HPV REFLEX: NORMAL
BKR LAB AP LMP: NORMAL
BKR LAB AP PREVIOUS ABNORMAL: NORMAL
PATH REPORT.COMMENTS IMP SPEC: NORMAL
PATH REPORT.COMMENTS IMP SPEC: NORMAL
PATH REPORT.RELEVANT HX SPEC: NORMAL

## 2022-08-02 LAB
HUMAN PAPILLOMA VIRUS 16 DNA: NEGATIVE
HUMAN PAPILLOMA VIRUS 18 DNA: NEGATIVE
HUMAN PAPILLOMA VIRUS FINAL DIAGNOSIS: NORMAL
HUMAN PAPILLOMA VIRUS OTHER HR: NEGATIVE

## 2022-09-17 ENCOUNTER — HEALTH MAINTENANCE LETTER (OUTPATIENT)
Age: 37
End: 2022-09-17

## 2022-12-20 ENCOUNTER — ALLIED HEALTH/NURSE VISIT (OUTPATIENT)
Dept: FAMILY MEDICINE | Facility: OTHER | Age: 37
End: 2022-12-20
Payer: COMMERCIAL

## 2022-12-20 DIAGNOSIS — Z23 ENCOUNTER FOR IMMUNIZATION: Primary | ICD-10-CM

## 2022-12-20 PROCEDURE — 90686 IIV4 VACC NO PRSV 0.5 ML IM: CPT

## 2022-12-20 PROCEDURE — 90471 IMMUNIZATION ADMIN: CPT

## 2023-09-09 ASSESSMENT — ENCOUNTER SYMPTOMS
COUGH: 0
NERVOUS/ANXIOUS: 0
FEVER: 0
NAUSEA: 0
HEMATURIA: 0
WEAKNESS: 0
DIARRHEA: 0
DYSURIA: 0
JOINT SWELLING: 0
SHORTNESS OF BREATH: 0
MYALGIAS: 0
SORE THROAT: 0
DIZZINESS: 0
ABDOMINAL PAIN: 0
CHILLS: 0
PARESTHESIAS: 0
HEMATOCHEZIA: 0
CONSTIPATION: 0
BREAST MASS: 0
HEADACHES: 0
HEARTBURN: 0
EYE PAIN: 0
FREQUENCY: 0
PALPITATIONS: 0
ARTHRALGIAS: 0

## 2023-09-12 ENCOUNTER — OFFICE VISIT (OUTPATIENT)
Dept: FAMILY MEDICINE | Facility: OTHER | Age: 38
End: 2023-09-12
Attending: FAMILY MEDICINE
Payer: COMMERCIAL

## 2023-09-12 VITALS
SYSTOLIC BLOOD PRESSURE: 104 MMHG | RESPIRATION RATE: 12 BRPM | HEART RATE: 71 BPM | WEIGHT: 135.8 LBS | OXYGEN SATURATION: 97 % | DIASTOLIC BLOOD PRESSURE: 70 MMHG | BODY MASS INDEX: 23.18 KG/M2 | HEIGHT: 64 IN | TEMPERATURE: 99.1 F

## 2023-09-12 DIAGNOSIS — Z30.431 IUD CHECK UP: ICD-10-CM

## 2023-09-12 DIAGNOSIS — Z00.00 PHYSICAL EXAM, ANNUAL: Primary | ICD-10-CM

## 2023-09-12 DIAGNOSIS — F41.8 SITUATIONAL ANXIETY: ICD-10-CM

## 2023-09-12 PROCEDURE — 99395 PREV VISIT EST AGE 18-39: CPT | Performed by: FAMILY MEDICINE

## 2023-09-12 RX ORDER — PROPRANOLOL HYDROCHLORIDE 10 MG/1
10 TABLET ORAL DAILY PRN
Qty: 10 TABLET | Refills: 0 | Status: SHIPPED | OUTPATIENT
Start: 2023-09-12 | End: 2023-10-16

## 2023-09-12 ASSESSMENT — ENCOUNTER SYMPTOMS
FEVER: 0
ARTHRALGIAS: 0
SHORTNESS OF BREATH: 0
JOINT SWELLING: 0
NAUSEA: 0
ABDOMINAL PAIN: 0
NERVOUS/ANXIOUS: 0
WEAKNESS: 0
BREAST MASS: 0
HEMATURIA: 0
COUGH: 0
SORE THROAT: 0
PALPITATIONS: 0
MYALGIAS: 0
HEARTBURN: 0
CONSTIPATION: 0
EYE PAIN: 0
CHILLS: 0
PARESTHESIAS: 0
HEADACHES: 0
HEMATOCHEZIA: 0
DIARRHEA: 0
DYSURIA: 0
DIZZINESS: 0
FREQUENCY: 0

## 2023-09-12 ASSESSMENT — PAIN SCALES - GENERAL: PAINLEVEL: NO PAIN (0)

## 2023-09-12 NOTE — NURSING NOTE
"Chief Complaint   Patient presents with    Physical     Annual well visit       Initial /70 (BP Location: Right arm, Patient Position: Sitting, Cuff Size: Adult Regular)   Pulse 71   Temp 99.1  F (37.3  C) (Temporal)   Resp 12   Ht 1.632 m (5' 4.25\")   Wt 61.6 kg (135 lb 12.8 oz)   LMP 08/14/2023   SpO2 97%   Breastfeeding No   BMI 23.13 kg/m   Estimated body mass index is 23.13 kg/m  as calculated from the following:    Height as of this encounter: 1.632 m (5' 4.25\").    Weight as of this encounter: 61.6 kg (135 lb 12.8 oz).  Medication Reconciliation: complete      FOOD SECURITY SCREENING QUESTIONS:    The next two questions are to help us understand your food security.  If you are feeling you need any assistance in this area, we have resources available to support you today.    Hunger Vital Signs:  Within the past 12 months we worried whether our food would run out before we got money to buy more. Never  Within the past 12 months the food we bought just didn't last and we didn't have money to get more. Never        Advance care plan reviewed      Ilana Montoya LPN on 9/12/2023 at 1:53 PM      "

## 2023-09-12 NOTE — PROGRESS NOTES
SUBJECTIVE:   CC: Trinidad Hunter is an 38 year old woman who presents for preventive health visit.       Patient has been advised of split billing requirements and indicates understanding: Yes  Healthy Habits:     Getting at least 3 servings of Calcium per day:  Yes    Bi-annual eye exam:  NO    Dental care twice a year:  Yes    Sleep apnea or symptoms of sleep apnea:  None    Diet:  Regular (no restrictions)    Frequency of exercise:  4-5 days/week    Duration of exercise:  30-45 minutes    Taking medications regularly:  Yes    Barriers to taking medications:  None and Not applicable    Medication side effects:  Not applicable and None    Additional concerns today:  No        PROBLEMS TO ADD ON...  1.  Mirena IUD  2019, light menses, doesn't always get cramping   -  has had a vasectomy 2023; wondering about IUD removal vs REALLY doesn't want to get pregnant  ; likes having the menstrual control.  However, she does think she gets some bloating as a side effect of this.    2.  Situational anxiety.  She states that this is when she needs to speak to a group, crowd.  This happens not infrequently for work.  She does fine if it is something that she has prepared, but if it needs to be more extemporaneously/this is called on, she feels anxious, she then starts to become flushed.    Today's PHQ-2 Score:       9/11/2023     3:14 PM   PHQ-2 ( 1999 [employer])   Q1: Little interest or pleasure in doing things 0   Q2: Feeling down, depressed or hopeless 0   PHQ-2 Score 0   Q1: Little interest or pleasure in doing things Not at all   Q2: Feeling down, depressed or hopeless Not at all   PHQ-2 Score 0       Abuse: Current or Past (Physical, Sexual or Emotional) - No  Do you feel safe in your environment? Yes    Have you ever done Advance Care Planning? (For example, a Health Directive, POLST, or a discussion with a medical provider or your loved ones about your wishes): No, advance care planning information given to patient  to review.  Patient declined advance care planning discussion at this time.    Social History     Tobacco Use    Smoking status: Never     Passive exposure: Never    Smokeless tobacco: Never   Substance Use Topics    Alcohol use: Yes     Alcohol/week: 5.0 standard drinks of alcohol     Types: 5 Standard drinks or equivalent per week     Comment: 5-7 summer/0 winter             2023    10:33 AM   Alcohol Use   Prescreen: >3 drinks/day or >7 drinks/week? No       Reviewed orders with patient.  Reviewed health maintenance and updated orders accordingly - Yes      Breast Cancer Screening:  Any new diagnosis of family breast, ovarian, or bowel cancer? No    FHS-7:        No data to display                  Pertinent mammograms are reviewed under the imaging tab.    History of abnormal Pap smear: NO - age 30-65 PAP every 5 years with negative HPV co-testing recommended      Latest Ref Rng & Units 2022     8:31 AM   PAP / HPV   PAP  Negative for Intraepithelial Lesion or Malignancy (NILM)    HPV 16 DNA Negative Negative    HPV 18 DNA Negative Negative    Other HR HPV Negative Negative    Reviewed and updated as needed this visit by clinical staff   Tobacco   Meds      Soc Hx        Reviewed and updated as needed this visit by Provider                 Past Medical History:   Diagnosis Date    Gastro-esophageal reflux disease without esophagitis     No Comments Provided      Past Surgical History:   Procedure Laterality Date    OTHER SURGICAL HISTORY      [id number],NO PREVIOUS SURGERY    OTHER SURGICAL HISTORY      10/24/2017,35733.0,[facility] OBSTE CARE VAG DELIV W POSTPARTUM     OB History    Para Term  AB Living   3 3 3 0 0 3   SAB IAB Ectopic Multiple Live Births   0 0 0 0 3      # Outcome Date GA Lbr Steven/2nd Weight Sex Delivery Anes PTL Lv   3 Term 19 39w0d 04:00 / 00:23 3.266 kg (7 lb 3.2 oz) M Vag-Spont EPI N LADI      Name: PALMER,MALE-SHARDA      Apgar1: 8  Apgar5: 9   2 Term 10/24/17   2.693 kg (5  "lb 15 oz) M IVD EPI N LADI      Name: Osvaldo Conroy Term 03/09/14 40w6d  2.91 kg (6 lb 6.7 oz) F Vag-Spont EPI N LADI      Name: PALMER,BABY GIRL      Apgar1: 9  Apgar5: 9       Review of Systems   Constitutional:  Negative for chills and fever.   HENT:  Negative for congestion, ear pain, hearing loss and sore throat.    Eyes:  Negative for pain and visual disturbance.   Respiratory:  Negative for cough and shortness of breath.    Cardiovascular:  Negative for chest pain, palpitations and peripheral edema.   Gastrointestinal:  Negative for abdominal pain, constipation, diarrhea, heartburn, hematochezia and nausea.   Breasts:  Negative for tenderness, breast mass and discharge.   Genitourinary:  Negative for dysuria, frequency, genital sores, hematuria, pelvic pain, urgency, vaginal bleeding and vaginal discharge.   Musculoskeletal:  Negative for arthralgias, joint swelling and myalgias.   Skin:  Negative for rash.   Neurological:  Negative for dizziness, weakness, headaches and paresthesias.   Psychiatric/Behavioral:  Negative for mood changes. The patient is not nervous/anxious.    Dentist - in June   Acid reflux - was on medication in 2019; gets bloating  - she thinks this has been her whole life   Lower abdomen - feels like she can't suck it in   Skin - good  Sleep - good   Allergies - more this fall with nasal and eye symptoms.  Takes zyrtec if needed         OBJECTIVE:   /70 (BP Location: Right arm, Patient Position: Sitting, Cuff Size: Adult Regular)   Pulse 71   Temp 99.1  F (37.3  C) (Temporal)   Resp 12   Ht 1.632 m (5' 4.25\")   Wt 61.6 kg (135 lb 12.8 oz)   LMP 08/14/2023   SpO2 97%   Breastfeeding No   BMI 23.13 kg/m    Physical Exam  GENERAL: healthy, alert and no distress  EYES: Eyes grossly normal to inspection, PERRL and conjunctivae and sclerae normal  HENT: ear canals and TM's normal, nose and mouth without ulcers or lesions  NECK: no adenopathy, no asymmetry, masses, or scars and " "thyroid normal to palpation  RESP: lungs clear to auscultation - no rales, rhonchi or wheezes  BREAST: normal without masses, tenderness or nipple discharge and no palpable axillary masses or adenopathy  CV: regular rate and rhythm, normal S1 S2, no S3 or S4, no murmur, click or rub, no peripheral edema and peripheral pulses strong  ABDOMEN: soft, nontender  :  Pap smear today   - IUD strings visualized   MS: no gross musculoskeletal defects noted, no edema  SKIN: no suspicious lesions or rashes  NEURO: Normal strength and tone, mentation intact and speech normal  PSYCH: mentation appears normal, affect normal/bright        ASSESSMENT/PLAN:       ICD-10-CM    1. Physical exam, annual  Z00.00       2. IUD check up  Z30.431       3. Situational anxiety  F41.8 propranolol (INDERAL) 10 MG tablet          1.  She would like her  to have 1 additional semen analysis done before removal of her IUD.  She will schedule a follow-up appointment in approximately a month for this.  2.  Discussed the use of beta-blockers for situational anxiety/performance anxiety.  We also discussed side effects of these medications related to bradycardia, hypotension.  She would like a trial of this medication.  I have encouraged her to do a dose at home, perhaps half dose, with another adult present in case she were to get symptoms/side effects.  We did also discuss other means of helping her symptoms including Valsalva, drinking cold water, visualization exercises.    COUNSELING:  Reviewed preventive health counseling, as reflected in patient instructions    Estimated body mass index is 23.13 kg/m  as calculated from the following:    Height as of this encounter: 1.632 m (5' 4.25\").    Weight as of this encounter: 61.6 kg (135 lb 12.8 oz).        She reports that she has never smoked. She has never been exposed to tobacco smoke. She has never used smokeless tobacco.      Counseling Resources:  ATP IV Guidelines  Pooled Cohorts " Equation Calculator  Breast Cancer Risk Calculator  BRCA-Related Cancer Risk Assessment: FHS-7 Tool  FRAX Risk Assessment  ICSI Preventive Guidelines  Dietary Guidelines for Americans, 2010  USDA's MyPlate  ASA Prophylaxis  Lung CA Screening    MICHAEL PENA MD  [facility]

## 2023-10-15 NOTE — PROGRESS NOTES
"    Assessment & Plan       ICD-10-CM    1. Encounter for IUD removal  Z30.432 REMOVE INTRAUTERINE DEVICE      2. Situational anxiety  F41.8 propranolol (INDERAL) 10 MG tablet      3. Need for prophylactic vaccination and inoculation against influenza  Z23 INFLUENZA VACCINE >6 MONTHS (AFLURIA/FLUZONE)          IUD removed as below.  Birth control is 's vasectomy.  Continue inderal prn anxiety   Flu vaccine updated     PDMP Review       None                         No follow-ups on file.    MICHAEL PENA MD  [facility]    [facility] 38 [facility]  presenting for the following health issues: Nursing Notes:   Ilana Montoya LPN  10/16/2023  2:54 PM  Signed  Chief Complaint   Patient presents with    IUD     IUD removal       Initial /80 (BP Location: Right arm, Patient Position: Sitting, Cuff Size: Adult Regular)   Pulse 73   Temp 97.8  F (36.6  C) (Temporal)   Resp 12   Ht 1.632 m (5' 4.25\")   Wt 61.1 kg (134 lb 9.6 oz)   LMP 10/09/2023 (Approximate)   SpO2 99%   Breastfeeding No   BMI 22.92 kg/m   Estimated body mass index is 22.92 kg/m  as calculated from the following:    Height as of this encounter: 1.632 m (5' 4.25\").    Weight as of this encounter: 61.1 kg (134 lb 9.6 oz).    Medication Reconciliation: complete      Advance care plan reviewed      Ilana Montoya LPN on 10/16/2023 at 2:53 PM                                 [facility] 38 [facility] presents for IUD removal.   has had vasectomy and subsequent SA has been negative.    Situational/performance anxiety - taking inderal prn and this has worked very well without side effects.        Current Outpatient Medications   Medication    propranolol (INDERAL) 10 MG tablet     No current facility-administered medications for this visit.     Past Medical History:   Diagnosis Date    Gastro-esophageal reflux disease without esophagitis     No Comments Provided " "              Review of Systems           12/1/2017    11:00 AM 2/26/2019     8:00 AM 6/20/2019    12:45 PM   PHQ   PHQ-9 Total Score 0 0 0   Q9: Thoughts of better off dead/self-harm past 2 weeks Not at all Not at all Not at all         2/26/2019     8:00 AM   ANN MARIE-7 SCORE   Total Score 0             Objective  /80 (BP Location: Right arm, Patient Position: Sitting, Cuff Size: Adult Regular)   Pulse 73   Temp 97.8  F (36.6  C) (Temporal)   Resp 12   Ht 1.632 m (5' 4.25\")   Wt 61.1 kg (134 lb 9.6 oz)   LMP 10/09/2023 (Approximate)   SpO2 99%   Breastfeeding No   BMI 22.92 kg/m     Physical Exam   GENERAL: healthy, alert and no distress   (female): normal female external genitalia and normal cervix.  IUD strings grasped and removed intact.              Answers submitted by the patient for this visit:  General Questionnaire (Submitted on 10/15/2023)  Chief Complaint: Chronic problems general questions HPI Form  What is the reason for your visit today? : IUD removal  How many servings of fruits and vegetables do you eat daily?: 2-3  On average, how many sweetened beverages do you drink each day (Examples: soda, juice, sweet tea, etc.  Do NOT count diet or artificially sweetened beverages)?: 1  How many minutes a day do you exercise enough to make your heart beat faster?: 30 to 60  How many days a week do you exercise enough to make your heart beat faster?: 6  How many days per week do you miss taking your medication?: 0    "

## 2023-10-16 ENCOUNTER — OFFICE VISIT (OUTPATIENT)
Dept: FAMILY MEDICINE | Facility: OTHER | Age: 38
End: 2023-10-16
Attending: FAMILY MEDICINE
Payer: COMMERCIAL

## 2023-10-16 VITALS
HEIGHT: 64 IN | RESPIRATION RATE: 12 BRPM | DIASTOLIC BLOOD PRESSURE: 80 MMHG | SYSTOLIC BLOOD PRESSURE: 120 MMHG | BODY MASS INDEX: 22.98 KG/M2 | HEART RATE: 73 BPM | OXYGEN SATURATION: 99 % | WEIGHT: 134.6 LBS | TEMPERATURE: 97.8 F

## 2023-10-16 DIAGNOSIS — Z23 NEED FOR PROPHYLACTIC VACCINATION AND INOCULATION AGAINST INFLUENZA: ICD-10-CM

## 2023-10-16 DIAGNOSIS — Z30.432 ENCOUNTER FOR IUD REMOVAL: Primary | ICD-10-CM

## 2023-10-16 DIAGNOSIS — F41.8 SITUATIONAL ANXIETY: ICD-10-CM

## 2023-10-16 PROCEDURE — 58301 REMOVE INTRAUTERINE DEVICE: CPT | Performed by: FAMILY MEDICINE

## 2023-10-16 PROCEDURE — 99212 OFFICE O/P EST SF 10 MIN: CPT | Mod: 25 | Performed by: FAMILY MEDICINE

## 2023-10-16 PROCEDURE — 90686 IIV4 VACC NO PRSV 0.5 ML IM: CPT | Performed by: FAMILY MEDICINE

## 2023-10-16 PROCEDURE — 90471 IMMUNIZATION ADMIN: CPT | Performed by: FAMILY MEDICINE

## 2023-10-16 RX ORDER — PROPRANOLOL HYDROCHLORIDE 10 MG/1
10 TABLET ORAL DAILY PRN
Qty: 20 TABLET | Refills: 11 | Status: SHIPPED | OUTPATIENT
Start: 2023-10-16

## 2023-10-16 ASSESSMENT — PAIN SCALES - GENERAL: PAINLEVEL: NO PAIN (0)

## 2023-10-16 NOTE — NURSING NOTE
"Chief Complaint   Patient presents with    IUD     IUD removal       Initial /80 (BP Location: Right arm, Patient Position: Sitting, Cuff Size: Adult Regular)   Pulse 73   Temp 97.8  F (36.6  C) (Temporal)   Resp 12   Ht 1.632 m (5' 4.25\")   Wt 61.1 kg (134 lb 9.6 oz)   LMP 10/09/2023 (Approximate)   SpO2 99%   Breastfeeding No   BMI 22.92 kg/m   Estimated body mass index is 22.92 kg/m  as calculated from the following:    Height as of this encounter: 1.632 m (5' 4.25\").    Weight as of this encounter: 61.1 kg (134 lb 9.6 oz).    Medication Reconciliation: complete      Advance care plan reviewed      Ilana Montoya LPN on 10/16/2023 at 2:53 PM      "

## 2024-01-22 ENCOUNTER — OFFICE VISIT (OUTPATIENT)
Dept: FAMILY MEDICINE | Facility: OTHER | Age: 39
End: 2024-01-22
Payer: COMMERCIAL

## 2024-01-22 VITALS
DIASTOLIC BLOOD PRESSURE: 81 MMHG | TEMPERATURE: 99 F | HEART RATE: 68 BPM | OXYGEN SATURATION: 98 % | RESPIRATION RATE: 16 BRPM | SYSTOLIC BLOOD PRESSURE: 125 MMHG | WEIGHT: 136.3 LBS | BODY MASS INDEX: 23.27 KG/M2 | HEIGHT: 64 IN

## 2024-01-22 DIAGNOSIS — J02.9 SORE THROAT: Primary | ICD-10-CM

## 2024-01-22 DIAGNOSIS — Z20.818 STREPTOCOCCUS EXPOSURE: ICD-10-CM

## 2024-01-22 LAB — GROUP A STREP BY PCR: NOT DETECTED

## 2024-01-22 PROCEDURE — 87651 STREP A DNA AMP PROBE: CPT | Mod: ZL | Performed by: NURSE PRACTITIONER

## 2024-01-22 PROCEDURE — 99213 OFFICE O/P EST LOW 20 MIN: CPT | Performed by: NURSE PRACTITIONER

## 2024-01-22 NOTE — NURSING NOTE
"Chief Complaint   Patient presents with    Pharyngitis         Initial /81 (BP Location: Left arm, Patient Position: Sitting, Cuff Size: Adult Regular)   Pulse 68   Temp 99  F (37.2  C) (Temporal)   Resp 16   Ht 1.626 m (5' 4\")   Wt 61.8 kg (136 lb 4.8 oz)   SpO2 98%   BMI 23.40 kg/m   Estimated body mass index is 23.4 kg/m  as calculated from the following:    Height as of this encounter: 1.626 m (5' 4\").    Weight as of this encounter: 61.8 kg (136 lb 4.8 oz).     Advance Care Directive on file? no  Advance Care Directive provided to patient? no    FOOD SECURITY SCREENING QUESTIONS:    The next two questions are to help us understand your food security.  If you are feeling you need any assistance in this area, we have resources available to support you today.    Hunger Vital Signs:  Within the past 12 months we worried whether our food would run out before we got money to buy more. Never  Within the past 12 months the food we bought just didn't last and we didn't have money to get more. Never  Jeniffer Johnson LPN on 1/22/2024 at 2:05 PM      Jeniffer Johnson     "

## 2024-01-22 NOTE — PROGRESS NOTES
ASSESSMENT/PLAN:    I have reviewed the nursing notes.  I have reviewed the findings, diagnosis, plan and need for follow up with the patient.    1. Sore throat  2. Streptococcus exposure  - Group A Streptococcus PCR Throat Swab  Negative strep swab, no indication for antibiotics at this time. Treat symptomatically.   - Symptomatic treatment - Encouraged fluids, salt water gargles, honey (only if greater than 1 year in age due to risk of botulism), elevation, humidifier, sinus rinse/netti pot, lozenges, tea, topical vapor rub, popsicles, rest, etc   - May use over-the-counter Tylenol or ibuprofen PRN    Follow up if symptoms persist or worsen or concerns    I explained my diagnostic considerations and recommendations to the patient, who voiced understanding and agreement with the treatment plan. All questions were answered. We discussed potential side effects of any prescribed or recommended therapies, as well as expectations for response to treatments.    Socorro Sharp NP  1/22/2024  2:05 PM    HPI:    Trinidad Hunter is a 38 year old female  who presents to [facility] today for concerns of sore throat. Concerns for possible strep.  Youngest son is positive for strep.  She would prefer to be tested if possible.  She has a sore throat and slight congestion and low-grade fevers.    Past Medical History:   Diagnosis Date    Gastro-esophageal reflux disease without esophagitis     No Comments Provided     Past Surgical History:   Procedure Laterality Date    OTHER SURGICAL HISTORY      [id number],NO PREVIOUS SURGERY    OTHER SURGICAL HISTORY      10/24/2017,54664.0,[facility] OBSTE CARE VAG DELIV W POSTPARTUM     Social History     Tobacco Use    Smoking status: Never     Passive exposure: Never    Smokeless tobacco: Never   Substance Use Topics    Alcohol use: Yes     Alcohol/week: 5.0 standard drinks of alcohol     Types: 5 Standard drinks or equivalent per week     Comment: 5-7 summer/0 winter     Current Outpatient  "Medications   Medication Sig Dispense Refill    propranolol (INDERAL) 10 MG tablet Take 1 tablet (10 mg) by mouth daily as needed (anxiety) 20 tablet 11     Allergies   Allergen Reactions    Sulfa Antibiotics Rash     Past medical history, past surgical history, current medications and allergies reviewed and accurate to the best of my knowledge.      ROS:  Refer to HPI    /81 (BP Location: Left arm, Patient Position: Sitting, Cuff Size: Adult Regular)   Pulse 68   Temp 99  F (37.2  C) (Temporal)   Resp 16   Ht 1.626 m (5' 4\")   Wt 61.8 kg (136 lb 4.8 oz)   SpO2 98%   BMI 23.40 kg/m      EXAM:  General Appearance: Well appearing 38 year old female, appropriate appearance for age. No acute distress   Eyes: conjunctivae normal without erythema or irritation, corneas clear, no drainage or crusting, no eyelid swelling, pupils equal   Oropharynx: moist mucous membranes, posterior pharynx with erythema, tonsils symmetric and 1+, + erythema, no exudates or petechiae, no post nasal drip seen, no trismus, voice clear.    Nose:  Bilateral nares: no erythema, no edema, no drainage or congestion   Neck: supple without adenopathy  Respiratory: normal chest wall and respirations.  Normal effort.  Clear to auscultation bilaterally, no wheezing, crackles or rhonchi.  No increased work of breathing.  No cough appreciated.  Cardiac: RRR with no murmurs  Musculoskeletal:  Equal movement of bilateral upper extremities.  Equal movement of bilateral lower extremities.  Normal gait.    Neuro: Alert and oriented to person, place, and time.   Psychological: normal affect, alert, oriented, and pleasant.     Results for orders placed or performed in visit on 01/22/24   Group A Streptococcus PCR Throat Swab     Status: Normal    Specimen: Throat; Swab   Result Value Ref Range    Group A strep by PCR Not Detected Not Detected    Narrative    The Xpert Xpress Strep A test, performed on the [employer]  Instrument Systems, is a rapid, " qualitative in vitro diagnostic test for the detection of Streptococcus pyogenes (Group A ß-hemolytic Streptococcus, Strep A) in throat swab specimens from patients with signs and symptoms of pharyngitis. The Xpert Xpress Strep A test can be used as an aid in the diagnosis of Group A Streptococcal pharyngitis. The assay is not intended to monitor treatment for Group A Streptococcus infections. The Xpert Xpress Strep A test utilizes an automated real-time polymerase chain reaction (PCR) to detect Streptococcus pyogenes DNA.

## 2024-04-04 NOTE — PROGRESS NOTES
"    Assessment & Plan       ICD-10-CM    1. Other fatigue  R53.83 CBC W PLT No Diff     Iron     Ferritin     Comprehensive Metabolic Panel     TSH     CBC W PLT No Diff     Iron     Ferritin     Comprehensive Metabolic Panel     TSH      2. Abdominal bloating  R14.0 Tissue transglutaminase Ab IgA and IgG     Allergy adult food panel     Tissue transglutaminase Ab IgA and IgG     Allergy adult food panel      3. Other migraine without status migrainosus, not intractable  G43.809            Differential diagnosis of symptoms is broad, including anemia, thyroid disease, cardiorespiratory diseae, post-viral, pregnancy, amongst others.  Labs back as below.    Brief discussion of migraine medications, be that Imitrex for acute vs Topamax or nurtec for prevention  Patient with concerns for food allergy vs intolerances vs sensitivity causing bowel issues.  She wishes to start with food allergy panel and celiac testing and will follow up from there.     PDMP Review       None            Ordering of each unique test         No follow-ups on file.    MICHAEL PENA MD  [facility] is a 38 year old female  presenting for the following health issues: Nursing Notes:   Ilana Montoya LPN  4/5/2024  3:20 PM  Signed  Chief Complaint   Patient presents with    Fatigue     Headaches, dizziness, vertigo, heavy menstrual cycles, cold all the time, bloating       Initial /78 (BP Location: Right arm, Patient Position: Sitting, Cuff Size: Adult Regular)   Pulse 89   Temp 99.2  F (37.3  C) (Tympanic)   Resp 16   Ht 1.626 m (5' 4\")   Wt 61.1 kg (134 lb 9.6 oz)   LMP 03/07/2024 (Exact Date)   SpO2 99%   Breastfeeding No   BMI 23.10 kg/m   Estimated body mass index is 23.1 kg/m  as calculated from the following:    Height as of this encounter: 1.626 m (5' 4\").    Weight as of this encounter: 61.1 kg (134 lb 9.6 oz).    Medication Review: complete    The next two " questions are to help us understand your food security.  If you are feeling you need any assistance in this area, we have resources available to support you today.          10/15/2023   SDOH- Food Insecurity   Within the past 12 months, did you worry that your food would run out before you got money to buy more? N   Within the past 12 months, did the food you bought just not last and you didn t have money to get more? N       Health Care Directive:  Patient does not have a Health Care Directive or Living Will: Discussed advance care planning with patient; however, patient declined at this time.    Ilana Montoya, LPN                                 HPI Trinidad Hunter is a 38 year old female presents for change in headaches and change in energy.  The first week of March after a work trip she  had a two week period of time when she had severe fatigue and headaches.  Had brain fog then.  No fever.  Had vomiting x2 with her headache.    Second time was in the shower, bright light exposure.    Has had ear drainage.  Some postural heaviness.      No diarrhea or constipation.  But has chronic bloating.    Wt gain - mid section.    Wt Readings from Last 4 Encounters:   04/05/24 61.1 kg (134 lb 9.6 oz)   01/22/24 61.8 kg (136 lb 4.8 oz)   10/16/23 61.1 kg (134 lb 9.6 oz)   09/12/23 61.6 kg (135 lb 12.8 oz)       IUD is out - periods are regular but super heavy.      Answers submitted by the patient for this visit:  General Questionnaire (Submitted on 4/5/2024)  Chief Complaint: Chronic problems general questions HPI Form  How many servings of fruits and vegetables do you eat daily?: 2-3  On average, how many sweetened beverages do you drink each day (Examples: soda, juice, sweet tea, etc.  Do NOT count diet or artificially sweetened beverages)?: 1  How many minutes a day do you exercise enough to make your heart beat faster?: 30 to 60  How many days a week do you exercise enough to make your heart beat faster?: 7  How many  "days per week do you miss taking your medication?: 0  General Concern (Submitted on 4/5/2024)  Chief Complaint: Chronic problems general questions HPI Form  What is the reason for your visit today?: Fatigue, bloating, headaches, vertigo  When did your symptoms begin?: 3-4 weeks ago  How would you describe these symptoms?: Mild  Are your symptoms:: Staying the same  Have you had these symptoms before?: Yes  Have you tried or received treatment for these symptoms before?: No    Bowel concerns for some time - bloating. Wonders if she can get food allergy testing done.  Uncertain regarding spcific food.        Current Outpatient Medications   Medication Sig Dispense Refill    propranolol (INDERAL) 10 MG tablet Take 1 tablet (10 mg) by mouth daily as needed (anxiety) 20 tablet 11     No current facility-administered medications for this visit.     Past Medical History:   Diagnosis Date    Gastro-esophageal reflux disease without esophagitis     No Comments Provided               Review of Systems   She can tell when she ovulates.  TSH   Date Value Ref Range Status   04/05/2024 1.06 0.30 - 4.20 uIU/mL Final      Lab Results   Component Value Date    WBC 12.0 05/09/2019     Lab Results   Component Value Date    RBC 4.49 05/09/2019     Lab Results   Component Value Date    HGB 10.9 05/10/2019     Lab Results   Component Value Date    HCT 37.8 05/09/2019     No components found for: \"MCT\"  Lab Results   Component Value Date    MCV 84 05/09/2019     Lab Results   Component Value Date    MCH 27.4 05/09/2019     Lab Results   Component Value Date    MCHC 32.5 05/09/2019     Lab Results   Component Value Date    RDW 14.6 05/09/2019     Lab Results   Component Value Date     05/09/2019 12/1/2017    11:00 AM 2/26/2019     8:00 AM 6/20/2019    12:45 PM   PHQ   PHQ-9 Total Score 0 0 0   Q9: Thoughts of better off dead/self-harm past 2 weeks Not at all Not at all Not at all         2/26/2019     8:00 AM   ANN MARIE-7 " "SCORE   Total Score 0             Objective  /78 (BP Location: Right arm, Patient Position: Sitting, Cuff Size: Adult Regular)   Pulse 89   Temp 99.2  F (37.3  C) (Tympanic)   Resp 16   Ht 1.626 m (5' 4\")   Wt 61.1 kg (134 lb 9.6 oz)   LMP 03/07/2024 (Exact Date)   SpO2 99%   Breastfeeding No   BMI 23.10 kg/m     Physical Exam   GENERAL: alert and no distress  HENT: ear canals and TM's normal, nose and mouth without ulcers or lesions  HENT: normal cephalic/atraumatic, ear canals and TM's normal, nose and mouth without ulcers or lesions, oropharynx clear, and oral mucous membranes moist  RESP: lungs clear to auscultation - no rales, rhonchi or wheezes  CV: RRR   ABDOMEN: soft, mild left sided tenderness  SKIN:  no rash     Lab Results   Component Value Date    WBC 7.6 04/05/2024    WBC 12.0 05/09/2019     Lab Results   Component Value Date    RBC 4.94 04/05/2024    RBC 4.49 05/09/2019     Lab Results   Component Value Date    HGB 14.5 04/05/2024    HGB 10.9 05/10/2019     Lab Results   Component Value Date    HCT 43.5 04/05/2024    HCT 37.8 05/09/2019     No components found for: \"MCT\"  Lab Results   Component Value Date    MCV 88 04/05/2024    MCV 84 05/09/2019     Lab Results   Component Value Date    MCH 29.4 04/05/2024    MCH 27.4 05/09/2019     Lab Results   Component Value Date    MCHC 33.3 04/05/2024    MCHC 32.5 05/09/2019     Lab Results   Component Value Date    RDW 12.3 04/05/2024    RDW 14.6 05/09/2019     Lab Results   Component Value Date     04/05/2024     05/09/2019               "

## 2024-04-05 ENCOUNTER — OFFICE VISIT (OUTPATIENT)
Dept: FAMILY MEDICINE | Facility: OTHER | Age: 39
End: 2024-04-05
Attending: FAMILY MEDICINE
Payer: COMMERCIAL

## 2024-04-05 VITALS
WEIGHT: 134.6 LBS | OXYGEN SATURATION: 99 % | RESPIRATION RATE: 16 BRPM | TEMPERATURE: 99.2 F | BODY MASS INDEX: 22.98 KG/M2 | HEART RATE: 89 BPM | DIASTOLIC BLOOD PRESSURE: 78 MMHG | SYSTOLIC BLOOD PRESSURE: 120 MMHG | HEIGHT: 64 IN

## 2024-04-05 DIAGNOSIS — R14.0 ABDOMINAL BLOATING: ICD-10-CM

## 2024-04-05 DIAGNOSIS — R53.83 OTHER FATIGUE: Primary | ICD-10-CM

## 2024-04-05 DIAGNOSIS — G43.809 OTHER MIGRAINE WITHOUT STATUS MIGRAINOSUS, NOT INTRACTABLE: ICD-10-CM

## 2024-04-05 LAB
ALBUMIN SERPL BCG-MCNC: 4.6 G/DL (ref 3.5–5.2)
ALP SERPL-CCNC: 45 U/L (ref 40–150)
ALT SERPL W P-5'-P-CCNC: 18 U/L (ref 0–50)
ANION GAP SERPL CALCULATED.3IONS-SCNC: 10 MMOL/L (ref 7–15)
AST SERPL W P-5'-P-CCNC: 20 U/L (ref 0–45)
BILIRUB SERPL-MCNC: 0.5 MG/DL
BUN SERPL-MCNC: 11.8 MG/DL (ref 6–20)
CALCIUM SERPL-MCNC: 9.7 MG/DL (ref 8.6–10)
CHLORIDE SERPL-SCNC: 101 MMOL/L (ref 98–107)
CREAT SERPL-MCNC: 0.82 MG/DL (ref 0.51–0.95)
DEPRECATED HCO3 PLAS-SCNC: 25 MMOL/L (ref 22–29)
EGFRCR SERPLBLD CKD-EPI 2021: >90 ML/MIN/1.73M2
ERYTHROCYTE [DISTWIDTH] IN BLOOD BY AUTOMATED COUNT: 12.3 % (ref 10–15)
FERRITIN SERPL-MCNC: 99 NG/ML (ref 6–175)
GLUCOSE SERPL-MCNC: 90 MG/DL (ref 70–99)
HCT VFR BLD AUTO: 43.5 % (ref 35–47)
HGB BLD-MCNC: 14.5 G/DL (ref 11.7–15.7)
IRON SERPL-MCNC: 102 UG/DL (ref 37–145)
MCH RBC QN AUTO: 29.4 PG (ref 26.5–33)
MCHC RBC AUTO-ENTMCNC: 33.3 G/DL (ref 31.5–36.5)
MCV RBC AUTO: 88 FL (ref 78–100)
PLATELET # BLD AUTO: 253 10E3/UL (ref 150–450)
POTASSIUM SERPL-SCNC: 3.8 MMOL/L (ref 3.4–5.3)
PROT SERPL-MCNC: 7.8 G/DL (ref 6.4–8.3)
RBC # BLD AUTO: 4.94 10E6/UL (ref 3.8–5.2)
SODIUM SERPL-SCNC: 136 MMOL/L (ref 135–145)
TSH SERPL DL<=0.005 MIU/L-ACNC: 1.06 UIU/ML (ref 0.3–4.2)
WBC # BLD AUTO: 7.6 10E3/UL (ref 4–11)

## 2024-04-05 PROCEDURE — 82728 ASSAY OF FERRITIN: CPT | Mod: ZL | Performed by: FAMILY MEDICINE

## 2024-04-05 PROCEDURE — 84443 ASSAY THYROID STIM HORMONE: CPT | Mod: ZL | Performed by: FAMILY MEDICINE

## 2024-04-05 PROCEDURE — 86003 ALLG SPEC IGE CRUDE XTRC EA: CPT | Mod: ZL | Performed by: FAMILY MEDICINE

## 2024-04-05 PROCEDURE — 99214 OFFICE O/P EST MOD 30 MIN: CPT | Performed by: FAMILY MEDICINE

## 2024-04-05 PROCEDURE — 82247 BILIRUBIN TOTAL: CPT | Mod: ZL | Performed by: FAMILY MEDICINE

## 2024-04-05 PROCEDURE — 85027 COMPLETE CBC AUTOMATED: CPT | Mod: ZL | Performed by: FAMILY MEDICINE

## 2024-04-05 PROCEDURE — 86364 TISS TRNSGLTMNASE EA IG CLAS: CPT | Mod: ZL | Performed by: FAMILY MEDICINE

## 2024-04-05 PROCEDURE — 36415 COLL VENOUS BLD VENIPUNCTURE: CPT | Mod: ZL | Performed by: FAMILY MEDICINE

## 2024-04-05 PROCEDURE — 83540 ASSAY OF IRON: CPT | Mod: ZL | Performed by: FAMILY MEDICINE

## 2024-04-05 ASSESSMENT — PAIN SCALES - GENERAL: PAINLEVEL: NO PAIN (0)

## 2024-04-05 NOTE — NURSING NOTE
"Chief Complaint   Patient presents with    Fatigue     Headaches, dizziness, vertigo, heavy menstrual cycles, cold all the time, bloating       Initial /78 (BP Location: Right arm, Patient Position: Sitting, Cuff Size: Adult Regular)   Pulse 89   Temp 99.2  F (37.3  C) (Tympanic)   Resp 16   Ht 1.626 m (5' 4\")   Wt 61.1 kg (134 lb 9.6 oz)   LMP 03/07/2024 (Exact Date)   SpO2 99%   Breastfeeding No   BMI 23.10 kg/m   Estimated body mass index is 23.1 kg/m  as calculated from the following:    Height as of this encounter: 1.626 m (5' 4\").    Weight as of this encounter: 61.1 kg (134 lb 9.6 oz).    Medication Review: complete    The next two questions are to help us understand your food security.  If you are feeling you need any assistance in this area, we have resources available to support you today.          10/15/2023   SDOH- Food Insecurity   Within the past 12 months, did you worry that your food would run out before you got money to buy more? N   Within the past 12 months, did the food you bought just not last and you didn t have money to get more? N       Health Care Directive:  Patient does not have a Health Care Directive or Living Will: Discussed advance care planning with patient; however, patient declined at this time.    Ilana Montoya LPN      "

## 2024-04-09 LAB
ALMOND IGE QN: <0.1 KU(A)/L
CASHEW NUT IGE QN: <0.1 KU(A)/L
CODFISH IGE QN: <0.1 KU(A)/L
COW MILK IGE QN: <0.1 KU(A)/L
EGG WHITE IGE QN: <0.1 KU(A)/L
HAZELNUT IGE QN: <0.1 KU(A)/L
IGE SERPL-ACNC: 57 KU/L (ref 0–114)
PEANUT IGE QN: <0.1 KU(A)/L
SALMON IGE QN: <0.1 KU(A)/L
SCALLOP IGE QN: <0.1 KU(A)/L
SESAME SEED IGE QN: <0.1 KU(A)/L
SHRIMP IGE QN: <0.1 KU(A)/L
SOYBEAN IGE QN: <0.1 KU(A)/L
TUNA IGE QN: <0.1 KU(A)/L
WALNUT IGE QN: <0.1 KU(A)/L
WHEAT IGE QN: <0.1 KU(A)/L

## 2024-04-10 LAB
TTG IGA SER-ACNC: 0.3 U/ML
TTG IGG SER-ACNC: 1.1 U/ML

## 2024-10-05 ENCOUNTER — MYC REFILL (OUTPATIENT)
Dept: FAMILY MEDICINE | Facility: OTHER | Age: 39
End: 2024-10-05
Payer: COMMERCIAL

## 2024-10-05 DIAGNOSIS — F41.8 SITUATIONAL ANXIETY: ICD-10-CM

## 2024-10-09 RX ORDER — PROPRANOLOL HYDROCHLORIDE 10 MG/1
10 TABLET ORAL DAILY PRN
Qty: 20 TABLET | Refills: 2 | Status: SHIPPED | OUTPATIENT
Start: 2024-10-09

## 2024-10-09 NOTE — TELEPHONE ENCOUNTER
Requested Prescriptions   Pending Prescriptions Disp Refills    propranolol (INDERAL) 10 MG tablet 20 tablet 11     Sig: Take 1 tablet (10 mg) by mouth daily as needed (anxiety).   Last Prescription Date:   10/16/23  Last Fill Qty/Refills:         20, R-11    Last Office Visit:              4/5/24   Future Office visit:              Future Appointments 10/9/2024 - 4/7/2025        Date Visit Type Length Department Provider     1/6/2025  2:00 PM [facility] PREVENTATIVE ADULT VISIT 40 min  FAMILY PRACTICE Hortensia Rob MD    Location Instructions:     [facility] [address]                   Prescription refilled per RN Medication Refill Policy.................... Donna Herrera RN ....................  10/9/2024   2:29 PM

## 2025-01-03 SDOH — HEALTH STABILITY: PHYSICAL HEALTH: ON AVERAGE, HOW MANY DAYS PER WEEK DO YOU ENGAGE IN MODERATE TO STRENUOUS EXERCISE (LIKE A BRISK WALK)?: 6 DAYS

## 2025-01-03 SDOH — HEALTH STABILITY: PHYSICAL HEALTH: ON AVERAGE, HOW MANY MINUTES DO YOU ENGAGE IN EXERCISE AT THIS LEVEL?: 30 MIN

## 2025-01-03 ASSESSMENT — SOCIAL DETERMINANTS OF HEALTH (SDOH): HOW OFTEN DO YOU GET TOGETHER WITH FRIENDS OR RELATIVES?: ONCE A WEEK

## 2025-01-06 ENCOUNTER — OFFICE VISIT (OUTPATIENT)
Dept: FAMILY MEDICINE | Facility: OTHER | Age: 40
End: 2025-01-06
Attending: FAMILY MEDICINE
Payer: COMMERCIAL

## 2025-01-06 VITALS
HEIGHT: 64 IN | SYSTOLIC BLOOD PRESSURE: 120 MMHG | WEIGHT: 138 LBS | DIASTOLIC BLOOD PRESSURE: 78 MMHG | BODY MASS INDEX: 23.56 KG/M2 | OXYGEN SATURATION: 98 % | TEMPERATURE: 97.3 F | HEART RATE: 66 BPM | RESPIRATION RATE: 14 BRPM

## 2025-01-06 DIAGNOSIS — Z23 NEED FOR IMMUNIZATION AGAINST INFLUENZA: ICD-10-CM

## 2025-01-06 DIAGNOSIS — D22.9 ATYPICAL MOLE: ICD-10-CM

## 2025-01-06 DIAGNOSIS — Z00.00 PHYSICAL EXAM, ANNUAL: Primary | ICD-10-CM

## 2025-01-06 DIAGNOSIS — G43.809 OTHER MIGRAINE WITHOUT STATUS MIGRAINOSUS, NOT INTRACTABLE: ICD-10-CM

## 2025-01-06 DIAGNOSIS — F41.8 SITUATIONAL ANXIETY: ICD-10-CM

## 2025-01-06 LAB
CHOLEST SERPL-MCNC: 211 MG/DL
FASTING STATUS PATIENT QL REPORTED: ABNORMAL
HDLC SERPL-MCNC: 62 MG/DL
LDLC SERPL CALC-MCNC: 129 MG/DL
NONHDLC SERPL-MCNC: 149 MG/DL
TRIGL SERPL-MCNC: 100 MG/DL

## 2025-01-06 PROCEDURE — 36415 COLL VENOUS BLD VENIPUNCTURE: CPT | Mod: ZL | Performed by: FAMILY MEDICINE

## 2025-01-06 PROCEDURE — 90656 IIV3 VACC NO PRSV 0.5 ML IM: CPT | Performed by: FAMILY MEDICINE

## 2025-01-06 PROCEDURE — 99395 PREV VISIT EST AGE 18-39: CPT | Mod: 25 | Performed by: FAMILY MEDICINE

## 2025-01-06 PROCEDURE — 82465 ASSAY BLD/SERUM CHOLESTEROL: CPT | Mod: ZL | Performed by: FAMILY MEDICINE

## 2025-01-06 PROCEDURE — 99213 OFFICE O/P EST LOW 20 MIN: CPT | Mod: 25 | Performed by: FAMILY MEDICINE

## 2025-01-06 PROCEDURE — 90471 IMMUNIZATION ADMIN: CPT | Performed by: FAMILY MEDICINE

## 2025-01-06 PROCEDURE — G2211 COMPLEX E/M VISIT ADD ON: HCPCS | Performed by: FAMILY MEDICINE

## 2025-01-06 RX ORDER — PROPRANOLOL HYDROCHLORIDE 10 MG/1
10 TABLET ORAL DAILY PRN
Qty: 20 TABLET | Refills: 2 | Status: SHIPPED | OUTPATIENT
Start: 2025-01-06

## 2025-01-06 ASSESSMENT — PAIN SCALES - GENERAL: PAINLEVEL_OUTOF10: NO PAIN (0)

## 2025-01-06 NOTE — PROGRESS NOTES
"Preventive Care Visit  [facility] AND [facility]  MICHAEL PENA MD, Family Medicine  Jan 6, 2025        ICD-10-CM    1. Physical exam, annual  Z00.00 Lipid Profile     Lipid Profile      2. Other migraine without status migrainosus, not intractable  G43.809       3. Situational anxiety  F41.8 propranolol (INDERAL) 10 MG tablet      4. Need for immunization against influenza  Z23 INFLUENZA VACCINE, SPLIT VIRUS, TRIVALENT,PF (FLUZONE\FLUARIX)      5. Atypical mole  D22.9          Lipid check today  Continue with inderal as needed to public speaking/situation anxiety  Flu vaccine updated  Will make follow up appointment for mole removal.           The longitudinal plan of care  was addressed during this visit. Due to the added complexity in care, I will continue to support Trinidad Hunter in the subsequent management of this condition(s) and with the ongoing continuity of care of this condition(s).       Kalyn Abdi is a 39 year old, presenting for the following:  Physical (Annual well visit)        1/6/2025     1:51 PM   Additional Questions   Roomed by Ilana TEJADA LPN          [facility]  Nursing Notes:   Ilana Montoya LPN  1/6/2025  1:55 PM  Signed  Chief Complaint   Patient presents with    Physical     Annual well visit       Initial /78 (BP Location: Right arm, Patient Position: Sitting, Cuff Size: Adult Regular)   Temp 97.3  F (36.3  C) (Temporal)   Resp 14   Ht 1.626 m (5' 4\")   Wt 62.6 kg (138 lb)   LMP 12/16/2024 (Approximate)   Breastfeeding No   BMI 23.69 kg/m   Estimated body mass index is 23.69 kg/m  as calculated from the following:    Height as of this encounter: 1.626 m (5' 4\").    Weight as of this encounter: 62.6 kg (138 lb).    Medication Review: complete    The next two questions are to help us understand your food security.  If you are feeling you need any assistance in this area, we have resources available to support you today.          1/3/2025   [facility]- Food " Insecurity   Within the past 12 months, did you worry that your food would run out before you got money to buy more? N   Within the past 12 months, did the food you bought just not last and you didn t have money to get more? N     Health Care Directive:  Patient does not have a Health Care Directive: Discussed advance care planning with patient; however, patient declined at this time.    Ilana Montoya, HUBERTN          Migraines - improved with electrolyte intake  Situational anxiety  - takes inderal prn        Health Care Directive  Patient does not have a Health Care Directive: Discussed advance care planning with patient; however, patient declined at this time.      1/3/2025   General Health   How would you rate your overall physical health? Excellent   Feel stress (tense, anxious, or unable to sleep) Only a little   (!) STRESS CONCERN      1/3/2025   Nutrition   Three or more servings of calcium each day? Yes   Diet: Regular (no restrictions)   How many servings of fruit and vegetables per day? (!) 2-3   How many sweetened beverages each day? 0-1         1/3/2025   Exercise   Days per week of moderate/strenous exercise 6 days   Average minutes spent exercising at this level 30 min         1/3/2025   Social Factors   Frequency of gathering with friends or relatives Once a week   Worry food won't last until get money to buy more No   Food not last or not have enough money for food? No   Do you have housing? (Housing is defined as stable permanent housing and does not include staying ouside in a car, in a tent, in an abandoned building, in an overnight shelter, or couch-surfing.) Yes   Are you worried about losing your housing? No   Lack of transportation? No   Unable to get utilities (heat,electricity)? No         1/3/2025   Dental   Dentist two times every year? Yes         1/3/2025   TB Screening   Were you born outside of the [place]? No         Today's PHQ-2 Score:       1/6/2025     1:44 PM   PHQ-2 ( 1999 [employer])    Q1: Little interest or pleasure in doing things 0   Q2: Feeling down, depressed or hopeless 0   PHQ-2 Score 0    Q1: Little interest or pleasure in doing things Not at all   Q2: Feeling down, depressed or hopeless Not at all   PHQ-2 Score 0       Patient-reported           1/3/2025   Substance Use   Alcohol more than 3/day or more than 7/wk No   Do you use any other substances recreationally? No     Social History     Tobacco Use    Smoking status: Never     Passive exposure: Never    Smokeless tobacco: Never   Vaping Use    Vaping status: Never Used   Substance Use Topics    Alcohol use: Yes     Alcohol/week: 5.0 standard drinks of alcohol     Types: 5 Standard drinks or equivalent per week     Comment: 5-7 summer/0 winter    Drug use: Never          Mammogram Screening - Patient under 40 years of age: Routine Mammogram Screening not recommended.         1/3/2025   STI Screening   New sexual partner(s) since last STI/HIV test? No     History of abnormal Pap smear: No - age 30- 64 PAP with HPV every 5 years recommended        Latest Ref Rng & Units 2022     8:31 AM   PAP / HPV   PAP  Negative for Intraepithelial Lesion or Malignancy (NILM)    HPV 16 DNA Negative Negative    HPV 18 DNA Negative Negative    Other HR HPV Negative Negative            1/3/2025   Contraception/Family Planning   Questions about contraception or family planning No        Reviewed and updated as needed this visit by Provider                    Past Medical History:   Diagnosis Date    Gastro-esophageal reflux disease without esophagitis     No Comments Provided     Past Surgical History:   Procedure Laterality Date    OTHER SURGICAL HISTORY      [id number],NO PREVIOUS SURGERY    OTHER SURGICAL HISTORY      10/24/2017,10745.0,[place] OBSTE CARE VAG DELIV W POSTPARTUM     OB History    Para Term  AB Living   3 3 3 0 0 3   SAB IAB Ectopic Multiple Live Births   0 0 0 0 3      # Outcome Date GA Lbr Steven/2nd Weight Sex Type Anes PTL Lv  "  3 Term 05/09/19 39w0d 04:00 / 00:23 3.266 kg (7 lb 3.2 oz) M Vag-Spont EPI N LADI      Name: PALMER,MALE-SHARDA      Apgar1: 8  Apgar5: 9   2 Term 10/24/17   2.693 kg (5 lb 15 oz) M IVD EPI N LADI      Name: Osvaldo Hunter    1 Term 03/09/14 40w6d  2.91 kg (6 lb 6.7 oz) F Vag-Spont EPI N LADI      Name: PALMER,BABY GIRL      Apgar1: 9  Apgar5: 9         Review of Systems  Eyes - no changes  Dentist - up to date    Has had LBP issues since college  Exercises most every day and doing more stretching now   New mole - left hip    IUD out last year and periods have normalized, normal length and heaviness   GI - taking colostrum and this has helped and a better pro/prebiotic as well         Objective    Exam  /78 (BP Location: Right arm, Patient Position: Sitting, Cuff Size: Adult Regular)   Pulse 66   Temp 97.3  F (36.3  C) (Temporal)   Resp 14   Ht 1.626 m (5' 4\")   Wt 62.6 kg (138 lb)   LMP 12/16/2024 (Approximate)   SpO2 98%   Breastfeeding No   BMI 23.69 kg/m     Estimated body mass index is 23.69 kg/m  as calculated from the following:    Height as of this encounter: 1.626 m (5' 4\").    Weight as of this encounter: 62.6 kg (138 lb).    Physical Exam  GENERAL: alert and no distress  EYES: Eyes grossly normal to inspection, PERRL and conjunctivae and sclerae normal  HENT: ear canals and TM's normal, nose and mouth without ulcers or lesions  NECK: no adenopathy, no asymmetry, masses, or scars  RESP: lungs clear to auscultation - no rales, rhonchi or wheezes  BREAST: normal without masses, tenderness or nipple discharge and no palpable axillary masses or adenopathy  CV: regular rate and rhythm, normal S1 S2, no S3 or S4, no murmur, click or rub, no peripheral edema  ABDOMEN: soft, nontender, no hepatosplenomegaly, no masses and bowel sounds normal  MS: no gross musculoskeletal defects noted, no edema  SKIN: no suspicious lesions or rashes  NEURO: Normal strength and tone, mentation intact and speech " normal  PSYCH: mentation appears normal, affect normal/bright        Signed Electronically by: MICHAEL PENA MD

## 2025-01-06 NOTE — NURSING NOTE
"Chief Complaint   Patient presents with    Physical     Annual well visit       Initial /78 (BP Location: Right arm, Patient Position: Sitting, Cuff Size: Adult Regular)   Temp 97.3  F (36.3  C) (Temporal)   Resp 14   Ht 1.626 m (5' 4\")   Wt 62.6 kg (138 lb)   LMP 12/16/2024 (Approximate)   Breastfeeding No   BMI 23.69 kg/m   Estimated body mass index is 23.69 kg/m  as calculated from the following:    Height as of this encounter: 1.626 m (5' 4\").    Weight as of this encounter: 62.6 kg (138 lb).    Medication Review: complete    The next two questions are to help us understand your food security.  If you are feeling you need any assistance in this area, we have resources available to support you today.          1/3/2025   SDOH- Food Insecurity   Within the past 12 months, did you worry that your food would run out before you got money to buy more? N   Within the past 12 months, did the food you bought just not last and you didn t have money to get more? N     Health Care Directive:  Patient does not have a Health Care Directive: Discussed advance care planning with patient; however, patient declined at this time.    Ilana Montoya LPN      "

## 2025-07-08 ENCOUNTER — HOSPITAL ENCOUNTER (EMERGENCY)
Facility: OTHER | Age: 40
Discharge: HOME | End: 2025-07-08
Attending: FAMILY MEDICINE
Payer: COMMERCIAL

## 2025-07-08 VITALS
RESPIRATION RATE: 16 BRPM | SYSTOLIC BLOOD PRESSURE: 139 MMHG | DIASTOLIC BLOOD PRESSURE: 96 MMHG | OXYGEN SATURATION: 99 % | HEART RATE: 79 BPM | HEIGHT: 64 IN | BODY MASS INDEX: 23.05 KG/M2 | WEIGHT: 135 LBS

## 2025-07-08 DIAGNOSIS — S86.111A: ICD-10-CM

## 2025-07-08 DIAGNOSIS — R26.89 IMPAIRED GAIT AND MOBILITY: ICD-10-CM

## 2025-07-08 PROCEDURE — 99282 EMERGENCY DEPT VISIT SF MDM: CPT | Performed by: FAMILY MEDICINE

## 2025-07-08 PROCEDURE — 99283 EMERGENCY DEPT VISIT LOW MDM: CPT | Performed by: FAMILY MEDICINE

## 2025-07-08 ASSESSMENT — ACTIVITIES OF DAILY LIVING (ADL): ADLS_ACUITY_SCORE: 42

## 2025-07-08 ASSESSMENT — COLUMBIA-SUICIDE SEVERITY RATING SCALE - C-SSRS
2. HAVE YOU ACTUALLY HAD ANY THOUGHTS OF KILLING YOURSELF IN THE PAST MONTH?: NO
6. HAVE YOU EVER DONE ANYTHING, STARTED TO DO ANYTHING, OR PREPARED TO DO ANYTHING TO END YOUR LIFE?: NO
1. IN THE PAST MONTH, HAVE YOU WISHED YOU WERE DEAD OR WISHED YOU COULD GO TO SLEEP AND NOT WAKE UP?: NO

## 2025-07-09 NOTE — ED TRIAGE NOTES
"Pt comes in to the ER with c/o R calf pain/tenderness. States she was running down her stairs and felt her calf \"pop\" Her ankle/achilles is not sore. Her upper calf  hurts most when she flexes her foot upwards. States her pain is pretty extreme when she puts weight on this extremity.     Triage Assessment (Adult)       Row Name 07/08/25 2601          Triage Assessment    Airway WDL WDL        Respiratory WDL    Respiratory WDL WDL        Skin Circulation/Temperature WDL    Skin Circulation/Temperature WDL WDL        Cardiac WDL    Cardiac WDL WDL        Peripheral/Neurovascular WDL    Peripheral Neurovascular WDL WDL        Cognitive/Neuro/Behavioral WDL    Cognitive/Neuro/Behavioral WDL WDL                     "

## 2025-07-09 NOTE — ED PROVIDER NOTES
History     Chief Complaint   Patient presents with    Leg Injury     The history is provided by the patient.     Trinidad Hunter is a 40 year old female here with right lower leg pain. She was running down the stairs at home and felt a pop and had immediate pain in the right calf. No other injury. She cannot dorsiflex the right foot or walk on it. She has pain in the calf, no pain in the lower leg near the ankle.    Allergies:  Allergies   Allergen Reactions    Sulfa Antibiotics Rash       Problem List:    Patient Active Problem List    Diagnosis Date Noted    Normal labor 2019     Priority: Medium     (normal spontaneous vaginal delivery) 10/24/2017     Priority: Medium    Gastroesophageal reflux disease 2016     Priority: Medium        Past Medical History:    Past Medical History:   Diagnosis Date    Gastro-esophageal reflux disease without esophagitis        Past Surgical History:    Past Surgical History:   Procedure Laterality Date    OTHER SURGICAL HISTORY      [id number],NO PREVIOUS SURGERY    OTHER SURGICAL HISTORY      10/24/2017,76731.0,[place] OBSTE CARE VAG DELIV W POSTPARTUM       Family History:    Family History   Problem Relation Age of Onset    Gout Father     Hypertension Father     Hyperlipidemia Father     Basal cell carcinoma Sister     Hypertension Sister     No Known Problems Brother     No Known Problems Daughter     No Known Problems Son     No Known Problems Son        Social History:  Marital Status:   [2]  Social History     Tobacco Use    Smoking status: Never     Passive exposure: Never    Smokeless tobacco: Never   Vaping Use    Vaping status: Never Used   Substance Use Topics    Alcohol use: Yes     Alcohol/week: 5.0 standard drinks of alcohol     Types: 5 Standard drinks or equivalent per week     Comment: 5-7 summer/0 winter    Drug use: Never        Medications:    propranolol (INDERAL) 10 MG tablet          Review of Systems   Musculoskeletal:  Positive for gait  "problem.   All other systems reviewed and are negative.      Physical Exam   BP: (!) 139/96  Pulse: 79  Resp: 16  Height: 162.6 cm (5' 4\")  Weight: 61.2 kg (135 lb)  SpO2: 99 %      Physical Exam  Vitals and nursing note reviewed.   Constitutional:       General: She is not in acute distress.     Appearance: Normal appearance.   Musculoskeletal:      Comments: Palpation shows tenderness of the middle body of the right gastrocnemius muscle. No tenderness or palpable softness of the Achilles. No tenderness behind the right knee. She is able to dorsiflex and plantar flex the ankle a little bit, with pain.   Neurological:      Mental Status: She is alert.         Assessments & Plan (with Medical Decision Making)  Trinidad Hunter is a 40 year old female here with right lower leg pain. She was running down the stairs at home and felt a pop and had immediate pain in the right calf. No other injury. She cannot dorsiflex the right foot or walk on it. She has pain in the calf, no pain in the lower leg near the ankle.  VS in the ED BP (!) 139/96   Pulse 79   Resp 16   Ht 1.626 m (5' 4\")   Wt 61.2 kg (135 lb)   SpO2 99%   BMI 23.17 kg/m    Exam is consistent with tear of the gastrocnemius muscle on the right.    Treat with crutches, non-weight bearing, follow up with PT.  She likes to exercise and swimming with a pull buoy is a good idea, no flip turns.     I have reviewed the nursing notes.    I have reviewed the findings, diagnosis, plan and need for follow up with the patient.  Medical Decision Making  The patient's presentation was of low complexity (an acute and uncomplicated illness or injury).    The patient's evaluation involved:  history and exam without other MDM data elements    The patient's management necessitated only low risk treatment.    Final diagnoses:   Rupture of right gastrocnemius muscle   Impaired gait and mobility       7/8/2025   [facility]       Zain, Monty Gould, " MD  07/08/25 2023

## 2025-07-09 NOTE — DISCHARGE INSTRUCTIONS
[place]    I think you have a partial rupture of the right calf muscle, called the gastrocnemius muscle.    I recommend crutches and follow up with PT.  I place a PT referral.     Thank you for choosing our Emergency Department for your care.     You may receive a phone call or letter for a survey about your care in our ED.  Please complete this as this is how we improve care for our patients.     If you have any questions after leaving the ED you can call or text me on my cell phone at [phone].  I am not on call so if I do not answer my phone please leave a message- I will get back to you.  If you are not doing well please return to the ED.     Sincerely,    Dr Peña Pittman M.D.  Medical Director  [facility] Department

## 2025-07-18 PROBLEM — R26.89 IMPAIRED GAIT AND MOBILITY: Status: ACTIVE | Noted: 2025-07-18

## 2025-07-18 PROBLEM — S86.111A: Status: ACTIVE | Noted: 2025-07-18

## 2025-07-24 ENCOUNTER — THERAPY VISIT (OUTPATIENT)
Dept: PHYSICAL THERAPY | Facility: OTHER | Age: 40
End: 2025-07-24
Attending: FAMILY MEDICINE
Payer: COMMERCIAL

## 2025-07-24 DIAGNOSIS — S86.111A: Primary | ICD-10-CM

## 2025-07-24 DIAGNOSIS — R26.89 IMPAIRED GAIT AND MOBILITY: ICD-10-CM

## 2025-07-24 PROCEDURE — 97110 THERAPEUTIC EXERCISES: CPT | Mod: GP

## 2025-09-01 ENCOUNTER — MYC REFILL (OUTPATIENT)
Dept: FAMILY MEDICINE | Facility: OTHER | Age: 40
End: 2025-09-01
Payer: COMMERCIAL

## 2025-09-01 DIAGNOSIS — F41.8 SITUATIONAL ANXIETY: ICD-10-CM

## 2025-09-02 RX ORDER — PROPRANOLOL HYDROCHLORIDE 10 MG/1
10 TABLET ORAL DAILY PRN
Qty: 20 TABLET | Refills: 2 | Status: SHIPPED | OUTPATIENT
Start: 2025-09-02